# Patient Record
Sex: FEMALE | Race: WHITE | NOT HISPANIC OR LATINO | Employment: FULL TIME | URBAN - METROPOLITAN AREA
[De-identification: names, ages, dates, MRNs, and addresses within clinical notes are randomized per-mention and may not be internally consistent; named-entity substitution may affect disease eponyms.]

---

## 2017-01-18 ENCOUNTER — HOSPITAL ENCOUNTER (OUTPATIENT)
Dept: RADIOLOGY | Facility: MEDICAL CENTER | Age: 67
End: 2017-01-18
Attending: STUDENT IN AN ORGANIZED HEALTH CARE EDUCATION/TRAINING PROGRAM
Payer: COMMERCIAL

## 2017-01-18 DIAGNOSIS — Z13.9 SCREENING: ICD-10-CM

## 2017-01-18 PROCEDURE — 77063 BREAST TOMOSYNTHESIS BI: CPT

## 2017-02-09 DIAGNOSIS — Z01.810 PRE-OPERATIVE CARDIOVASCULAR EXAMINATION: ICD-10-CM

## 2017-02-09 LAB — EKG IMPRESSION: NORMAL

## 2017-09-25 ENCOUNTER — APPOINTMENT (OUTPATIENT)
Dept: RADIOLOGY | Facility: MEDICAL CENTER | Age: 67
End: 2017-09-25
Attending: EMERGENCY MEDICINE
Payer: COMMERCIAL

## 2017-09-25 ENCOUNTER — HOSPITAL ENCOUNTER (EMERGENCY)
Facility: MEDICAL CENTER | Age: 67
End: 2017-09-25
Attending: EMERGENCY MEDICINE
Payer: COMMERCIAL

## 2017-09-25 VITALS
TEMPERATURE: 98.2 F | OXYGEN SATURATION: 94 % | HEART RATE: 68 BPM | RESPIRATION RATE: 17 BRPM | DIASTOLIC BLOOD PRESSURE: 88 MMHG | HEIGHT: 65 IN | SYSTOLIC BLOOD PRESSURE: 169 MMHG | WEIGHT: 154 LBS | BODY MASS INDEX: 25.66 KG/M2

## 2017-09-25 DIAGNOSIS — S20.219A CHEST WALL CONTUSION, UNSPECIFIED LATERALITY, INITIAL ENCOUNTER: ICD-10-CM

## 2017-09-25 DIAGNOSIS — S09.90XA CLOSED HEAD INJURY, INITIAL ENCOUNTER: ICD-10-CM

## 2017-09-25 DIAGNOSIS — S29.012A UPPER BACK STRAIN, INITIAL ENCOUNTER: ICD-10-CM

## 2017-09-25 DIAGNOSIS — S16.1XXA NECK STRAIN, INITIAL ENCOUNTER: ICD-10-CM

## 2017-09-25 DIAGNOSIS — S80.11XA MULTIPLE LEG CONTUSIONS, RIGHT, INITIAL ENCOUNTER: ICD-10-CM

## 2017-09-25 LAB
ALBUMIN SERPL BCP-MCNC: 4.3 G/DL (ref 3.2–4.9)
ALBUMIN/GLOB SERPL: 1.4 G/DL
ALP SERPL-CCNC: 62 U/L (ref 30–99)
ALT SERPL-CCNC: 19 U/L (ref 2–50)
ANION GAP SERPL CALC-SCNC: 8 MMOL/L (ref 0–11.9)
AST SERPL-CCNC: 27 U/L (ref 12–45)
BASOPHILS # BLD AUTO: 0.4 % (ref 0–1.8)
BASOPHILS # BLD: 0.02 K/UL (ref 0–0.12)
BILIRUB SERPL-MCNC: 0.6 MG/DL (ref 0.1–1.5)
BUN SERPL-MCNC: 9 MG/DL (ref 8–22)
CALCIUM SERPL-MCNC: 9.4 MG/DL (ref 8.5–10.5)
CHLORIDE SERPL-SCNC: 105 MMOL/L (ref 96–112)
CO2 SERPL-SCNC: 23 MMOL/L (ref 20–33)
CREAT SERPL-MCNC: 0.71 MG/DL (ref 0.5–1.4)
EOSINOPHIL # BLD AUTO: 0.02 K/UL (ref 0–0.51)
EOSINOPHIL NFR BLD: 0.4 % (ref 0–6.9)
ERYTHROCYTE [DISTWIDTH] IN BLOOD BY AUTOMATED COUNT: 43.2 FL (ref 35.9–50)
GFR SERPL CREATININE-BSD FRML MDRD: >60 ML/MIN/1.73 M 2
GLOBULIN SER CALC-MCNC: 3.1 G/DL (ref 1.9–3.5)
GLUCOSE SERPL-MCNC: 98 MG/DL (ref 65–99)
HCT VFR BLD AUTO: 46.1 % (ref 37–47)
HGB BLD-MCNC: 15.4 G/DL (ref 12–16)
IMM GRANULOCYTES # BLD AUTO: 0.04 K/UL (ref 0–0.11)
IMM GRANULOCYTES NFR BLD AUTO: 0.8 % (ref 0–0.9)
LYMPHOCYTES # BLD AUTO: 1.3 K/UL (ref 1–4.8)
LYMPHOCYTES NFR BLD: 27.1 % (ref 22–41)
MCH RBC QN AUTO: 30.7 PG (ref 27–33)
MCHC RBC AUTO-ENTMCNC: 33.4 G/DL (ref 33.6–35)
MCV RBC AUTO: 92 FL (ref 81.4–97.8)
MONOCYTES # BLD AUTO: 0.29 K/UL (ref 0–0.85)
MONOCYTES NFR BLD AUTO: 6.1 % (ref 0–13.4)
NEUTROPHILS # BLD AUTO: 3.12 K/UL (ref 2–7.15)
NEUTROPHILS NFR BLD: 65.2 % (ref 44–72)
NRBC # BLD AUTO: 0 K/UL
NRBC BLD AUTO-RTO: 0 /100 WBC
PLATELET # BLD AUTO: 247 K/UL (ref 164–446)
PMV BLD AUTO: 10.8 FL (ref 9–12.9)
POTASSIUM SERPL-SCNC: 4 MMOL/L (ref 3.6–5.5)
PROT SERPL-MCNC: 7.4 G/DL (ref 6–8.2)
RBC # BLD AUTO: 5.01 M/UL (ref 4.2–5.4)
SODIUM SERPL-SCNC: 136 MMOL/L (ref 135–145)
WBC # BLD AUTO: 4.8 K/UL (ref 4.8–10.8)

## 2017-09-25 PROCEDURE — 36415 COLL VENOUS BLD VENIPUNCTURE: CPT

## 2017-09-25 PROCEDURE — 700105 HCHG RX REV CODE 258: Performed by: EMERGENCY MEDICINE

## 2017-09-25 PROCEDURE — 72131 CT LUMBAR SPINE W/O DYE: CPT

## 2017-09-25 PROCEDURE — 99285 EMERGENCY DEPT VISIT HI MDM: CPT

## 2017-09-25 PROCEDURE — 85025 COMPLETE CBC W/AUTO DIFF WBC: CPT

## 2017-09-25 PROCEDURE — 72125 CT NECK SPINE W/O DYE: CPT

## 2017-09-25 PROCEDURE — 700102 HCHG RX REV CODE 250 W/ 637 OVERRIDE(OP): Performed by: EMERGENCY MEDICINE

## 2017-09-25 PROCEDURE — 70450 CT HEAD/BRAIN W/O DYE: CPT

## 2017-09-25 PROCEDURE — 80053 COMPREHEN METABOLIC PANEL: CPT

## 2017-09-25 PROCEDURE — A9270 NON-COVERED ITEM OR SERVICE: HCPCS | Performed by: EMERGENCY MEDICINE

## 2017-09-25 PROCEDURE — 71260 CT THORAX DX C+: CPT

## 2017-09-25 PROCEDURE — 72128 CT CHEST SPINE W/O DYE: CPT

## 2017-09-25 PROCEDURE — 700117 HCHG RX CONTRAST REV CODE 255: Performed by: EMERGENCY MEDICINE

## 2017-09-25 RX ORDER — IBUPROFEN 600 MG/1
600 TABLET ORAL ONCE
Status: COMPLETED | OUTPATIENT
Start: 2017-09-25 | End: 2017-09-25

## 2017-09-25 RX ORDER — AMLODIPINE BESYLATE 5 MG/1
5 TABLET ORAL DAILY
Qty: 30 TAB | Refills: 0 | Status: SHIPPED | OUTPATIENT
Start: 2017-09-25 | End: 2018-12-11

## 2017-09-25 RX ORDER — SODIUM CHLORIDE 9 MG/ML
INJECTION, SOLUTION INTRAVENOUS CONTINUOUS
Status: DISCONTINUED | OUTPATIENT
Start: 2017-09-25 | End: 2017-09-25 | Stop reason: HOSPADM

## 2017-09-25 RX ADMIN — SODIUM CHLORIDE: 9 INJECTION, SOLUTION INTRAVENOUS at 08:18

## 2017-09-25 RX ADMIN — IBUPROFEN 600 MG: 600 TABLET, FILM COATED ORAL at 10:48

## 2017-09-25 RX ADMIN — IOHEXOL 100 ML: 350 INJECTION, SOLUTION INTRAVENOUS at 10:23

## 2017-09-25 ASSESSMENT — PAIN SCALES - GENERAL
PAINLEVEL_OUTOF10: 4
PAINLEVEL_OUTOF10: 3
PAINLEVEL_OUTOF10: 4
PAINLEVEL_OUTOF10: 4
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
## 2017-09-25 NOTE — ED NOTES
Pt has returned from CT.  All results posted.  Pt requested ibuprofen, ERP phoned and aware, TO rec'd and pt medicated per MAR.  Pt updated that ERP to come bedside for re-eval asap.

## 2017-09-25 NOTE — DISCHARGE INSTRUCTIONS
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  MAKE SURE YOU:   · Understand these instructions.  · Will watch your condition.  · Will get help right away if you are not doing well or get worse.     This information is not intended to replace advice given to you by your health care provider. Make sure you discuss any questions you have with your health care provider.     Document Released: 11/30/2009 Document Revised: 01/08/2016 Document Reviewed: 08/25/2014  [employer]IC[employer] Interactive Patient Education ©2016 [employer]  Return if fever, vomiting or if no better in 12 hours.

## 2017-09-25 NOTE — ED NOTES
Pt found a jacket in her belongings which she wore for discharge home.  Pt has a friend picking her up to drive her home.  Pt ambulatory from the ED w/ steady gait.  All belongings in possession on discharge.  Pt escorted to the lobby by RN.

## 2017-12-14 ENCOUNTER — OFFICE VISIT (OUTPATIENT)
Dept: CARDIOLOGY | Facility: MEDICAL CENTER | Age: 67
End: 2017-12-14
Payer: COMMERCIAL

## 2017-12-14 VITALS
WEIGHT: 151 LBS | OXYGEN SATURATION: 98 % | DIASTOLIC BLOOD PRESSURE: 78 MMHG | HEART RATE: 66 BPM | HEIGHT: 65 IN | BODY MASS INDEX: 25.16 KG/M2 | SYSTOLIC BLOOD PRESSURE: 140 MMHG

## 2017-12-14 DIAGNOSIS — I10 ESSENTIAL HYPERTENSION: ICD-10-CM

## 2017-12-14 LAB — EKG IMPRESSION: NORMAL

## 2017-12-14 PROCEDURE — 99244 OFF/OP CNSLTJ NEW/EST MOD 40: CPT | Performed by: INTERNAL MEDICINE

## 2017-12-14 PROCEDURE — 93000 ELECTROCARDIOGRAM COMPLETE: CPT | Performed by: INTERNAL MEDICINE

## 2017-12-14 RX ORDER — UBIDECARENONE 50 MG
2 CAPSULE ORAL DAILY
COMMUNITY
End: 2018-12-11

## 2017-12-14 ASSESSMENT — ENCOUNTER SYMPTOMS
MUSCULOSKELETAL NEGATIVE: 1
DIZZINESS: 0
MEMORY LOSS: 0
HEARTBURN: 0
DOUBLE VISION: 0
LOSS OF CONSCIOUSNESS: 0
COUGH: 0
EYES NEGATIVE: 1
PALPITATIONS: 0
INSOMNIA: 1
SHORTNESS OF BREATH: 0
DIAPHORESIS: 0
NERVOUS/ANXIOUS: 0
DEPRESSION: 0
BLURRED VISION: 0
BRUISES/BLEEDS EASILY: 0
NAUSEA: 0

## 2017-12-14 ASSESSMENT — LIFESTYLE VARIABLES: SUBSTANCE_ABUSE: 0
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
## 2017-12-19 ENCOUNTER — HOSPITAL ENCOUNTER (OUTPATIENT)
Dept: CARDIOLOGY | Facility: MEDICAL CENTER | Age: 67
End: 2017-12-19
Attending: INTERNAL MEDICINE
Payer: COMMERCIAL

## 2017-12-19 DIAGNOSIS — I10 ESSENTIAL HYPERTENSION: ICD-10-CM

## 2017-12-19 PROCEDURE — 93306 TTE W/DOPPLER COMPLETE: CPT

## 2017-12-20 ENCOUNTER — TELEPHONE (OUTPATIENT)
Dept: CARDIOLOGY | Facility: MEDICAL CENTER | Age: 67
End: 2017-12-20

## 2017-12-20 DIAGNOSIS — E28.39 ESTROGEN DEFICIENCY: ICD-10-CM

## 2017-12-20 LAB
LV EJECT FRACT MOD 2C 99903: 49.74
LV EJECT FRACT MOD 4C 99902: 61.26
LV EJECT FRACT MOD BP 99901: 58.61

## 2017-12-20 NOTE — TELEPHONE ENCOUNTER
S/w pt, pt reports she does not have some one that could help her check her BP at home, she usually have some one from work checked it and she has not returned to work since the last time she saw Dr Bennett, pt reports she'll be back tomorrow and will be on for 3 days and she will have some one checked her BP, instructed pt to call the readings back, pt agreed and verbalizes understanding

## 2017-12-20 NOTE — TELEPHONE ENCOUNTER
----- Message -----  From: Verenice Bennett M.D.  Sent: 12/20/2017   1:27 PM  To: Marina Concepcion R.N.    Strong & normal heart on echo  No concerns - f/u as planned    ----- Message -----   From: Verenice Bennett M.D.   Sent: 12/29/2017   1:00 PM   To: Marina Concepcion R.N.     Labs great - including thyroid   Wondering how her BP is...   Tx!     =============================================================    Attempted to call pt, no answer, left vm to call back

## 2017-12-20 NOTE — TELEPHONE ENCOUNTER
----- Message -----  From: Verenice Bennett M.D.  Sent: 12/20/2017  To: Marina Concepcion R.N.  Subject: future task                                      Please see my note from December 14      =========================================    Upon chart review Dr Bennett OV notes 12/14/17:    We will call her in one week, if her blood pressure is still high we will increase the losartan or out low-dose carvedilol twice a day 12.5 mg.     Attempted to call pt, no answer, left vm to call back

## 2017-12-29 LAB
ALBUMIN SERPL-MCNC: 4.2 G/DL (ref 3.6–4.8)
ALBUMIN/GLOB SERPL: 1.6 {RATIO} (ref 1.2–2.2)
ALP SERPL-CCNC: 72 IU/L (ref 39–117)
ALT SERPL-CCNC: 17 IU/L (ref 0–32)
AST SERPL-CCNC: 20 IU/L (ref 0–40)
BILIRUB SERPL-MCNC: 0.5 MG/DL (ref 0–1.2)
BUN SERPL-MCNC: 11 MG/DL (ref 8–27)
BUN/CREAT SERPL: 15 (ref 12–28)
CALCIUM SERPL-MCNC: 9.6 MG/DL (ref 8.7–10.3)
CHLORIDE SERPL-SCNC: 104 MMOL/L (ref 96–106)
CHOLEST SERPL-MCNC: 239 MG/DL (ref 100–199)
CO2 SERPL-SCNC: 26 MMOL/L (ref 18–29)
COMMENT 011824: ABNORMAL
CREAT SERPL-MCNC: 0.75 MG/DL (ref 0.57–1)
FT4I SERPL CALC-MCNC: 1.7 (ref 1.2–4.9)
GLOBULIN SER CALC-MCNC: 2.7 G/DL (ref 1.5–4.5)
GLUCOSE SERPL-MCNC: 79 MG/DL (ref 65–99)
HDLC SERPL-MCNC: 56 MG/DL
IF AFRICAN AMERICAN  100797: 95 ML/MIN/1.73
IF NON AFRICAN AMER 100791: 83 ML/MIN/1.73
LDLC SERPL CALC-MCNC: 162 MG/DL (ref 0–99)
POTASSIUM SERPL-SCNC: 4.3 MMOL/L (ref 3.5–5.2)
PROT SERPL-MCNC: 6.9 G/DL (ref 6–8.5)
SODIUM SERPL-SCNC: 145 MMOL/L (ref 134–144)
T3RU NFR SERPL: 26 % (ref 24–39)
T4 SERPL-MCNC: 6.4 UG/DL (ref 4.5–12)
TRIGL SERPL-MCNC: 104 MG/DL (ref 0–149)
TSH SERPL DL<=0.005 MIU/L-ACNC: 0.74 UIU/ML (ref 0.45–4.5)
VLDLC SERPL CALC-MCNC: 21 MG/DL (ref 5–40)

## 2018-01-04 RX ORDER — ESTRADIOL 1 MG/1
1 TABLET ORAL DAILY
Qty: 30 TAB | Refills: 0 | OUTPATIENT
Start: 2018-01-04 | End: 2019-01-31

## 2018-01-04 NOTE — TELEPHONE ENCOUNTER
Called pt to checked and see if she has BP readings yet, per pt she has not been taking her BP lately, reinforced the importance of keeping BP log, pt verbalizes understanding. Discussed with pt also her recent Echo and lab results per Dr Bennett, pt understands.    Pt would like to ask Dr Bennett if she could fill her Estradiol, pt reports she stopped taking it about a year ago because she got afraid of having breast cancer from it but pt reports she can't stand the night sweats, mood swings that she currently are experiencing, pt reports she currently don't gave PCP and won't see a new one until next month.     To Dr Bennett

## 2018-01-04 NOTE — TELEPHONE ENCOUNTER
RUSLAN Coleman RTaniaNTania   Caller: Unspecified (2 weeks ago)             One month only   NR      =====================================================    Rx for Estradiol sent to [facility]     Attempted to call pt, no answer, detailed vm left

## 2018-01-11 ENCOUNTER — TELEPHONE (OUTPATIENT)
Dept: CARDIOLOGY | Facility: MEDICAL CENTER | Age: 68
End: 2018-01-11

## 2018-01-11 DIAGNOSIS — I10 ESSENTIAL HYPERTENSION: ICD-10-CM

## 2018-01-12 RX ORDER — LOSARTAN POTASSIUM 50 MG/1
50 TABLET ORAL DAILY
Qty: 90 TAB | Refills: 3 | OUTPATIENT
Start: 2018-01-12 | End: 2018-12-11

## 2018-01-12 NOTE — TELEPHONE ENCOUNTER
RUSLAN Coleman RTaniaNTania   Caller: Unspecified (Yesterday,  4:57 PM)             Ok to fill   tx      =====================================================    Refill Rx for Losartan 50mg daily called in to [facility]

## 2018-01-12 NOTE — TELEPHONE ENCOUNTER
----- Message from Bernadette Martini, Med Ass't sent at 1/11/2018  3:53 PM PST -----  Regarding: new RX request  New RX request for: losartan 50mg  Pharmacy is [facility] on [facility]  Medication is not located on med list, however mentioned on office visit notes, okay to fill?    ===============================================================    Attempted to call pt as she has not reported her BP log back to us, no answer, left vm to call back    To Dr Bennett if ok to fill Losartan 50mg? Thank You.

## 2018-06-05 ENCOUNTER — HOSPITAL ENCOUNTER (OUTPATIENT)
Dept: RADIOLOGY | Facility: MEDICAL CENTER | Age: 68
End: 2018-06-05
Attending: OBSTETRICS & GYNECOLOGY
Payer: COMMERCIAL

## 2018-06-05 DIAGNOSIS — Z12.31 ENCOUNTER FOR SCREENING MAMMOGRAM FOR MALIGNANT NEOPLASM OF BREAST: ICD-10-CM

## 2018-06-05 PROCEDURE — 77067 SCR MAMMO BI INCL CAD: CPT

## 2018-12-11 ENCOUNTER — HOSPITAL ENCOUNTER (OUTPATIENT)
Facility: MEDICAL CENTER | Age: 68
End: 2018-12-11
Attending: NURSE PRACTITIONER
Payer: COMMERCIAL

## 2018-12-11 ENCOUNTER — OFFICE VISIT (OUTPATIENT)
Dept: MEDICAL GROUP | Facility: MEDICAL CENTER | Age: 68
End: 2018-12-11
Payer: COMMERCIAL

## 2018-12-11 VITALS
WEIGHT: 150 LBS | DIASTOLIC BLOOD PRESSURE: 90 MMHG | TEMPERATURE: 97.4 F | RESPIRATION RATE: 16 BRPM | OXYGEN SATURATION: 96 % | HEART RATE: 76 BPM | HEIGHT: 65 IN | BODY MASS INDEX: 24.99 KG/M2 | SYSTOLIC BLOOD PRESSURE: 112 MMHG

## 2018-12-11 DIAGNOSIS — R10.2 SUPRAPUBIC PRESSURE: ICD-10-CM

## 2018-12-11 DIAGNOSIS — R35.0 URINARY FREQUENCY: ICD-10-CM

## 2018-12-11 LAB
APPEARANCE UR: CLEAR
BILIRUB UR STRIP-MCNC: NEGATIVE MG/DL
COLOR UR AUTO: YELLOW
GLUCOSE UR STRIP.AUTO-MCNC: NEGATIVE MG/DL
KETONES UR STRIP.AUTO-MCNC: NORMAL MG/DL
LEUKOCYTE ESTERASE UR QL STRIP.AUTO: NEGATIVE
NITRITE UR QL STRIP.AUTO: NEGATIVE
PH UR STRIP.AUTO: 5.5 [PH] (ref 5–8)
PROT UR QL STRIP: NORMAL MG/DL
RBC UR QL AUTO: NEGATIVE
SP GR UR STRIP.AUTO: 1.02
UROBILINOGEN UR STRIP-MCNC: 0.2 MG/DL

## 2018-12-11 PROCEDURE — 87086 URINE CULTURE/COLONY COUNT: CPT

## 2018-12-11 PROCEDURE — 81002 URINALYSIS NONAUTO W/O SCOPE: CPT | Performed by: NURSE PRACTITIONER

## 2018-12-11 PROCEDURE — 99203 OFFICE O/P NEW LOW 30 MIN: CPT | Performed by: NURSE PRACTITIONER

## 2018-12-11 RX ORDER — NITROFURANTOIN 25; 75 MG/1; MG/1
100 CAPSULE ORAL 2 TIMES DAILY
Qty: 10 CAP | Refills: 0 | Status: SHIPPED | OUTPATIENT
Start: 2018-12-11 | End: 2019-01-31

## 2018-12-11 ASSESSMENT — PATIENT HEALTH QUESTIONNAIRE - PHQ9: CLINICAL INTERPRETATION OF PHQ2 SCORE: 0

## 2018-12-11 NOTE — PROGRESS NOTES
"Subjective:     Chief Complaint   Patient presents with   • UTI     Jacqueline Woodard is a 68 y.o. female new patient here for evaluation of dysuria and suprapubic pressure.  She has not been seen by the medical group since 2014.  3 days ago she developed suprapubic pressure, generalized abdominal discomfort, dysuria, urinary frequency.  She started herself on Keflex, last dose was this morning.  She does feel that this helped slightly but her symptoms are not resolved.  She continues to feel the need to void often, is passing small amounts of urine, she has some lower abdominal pressure.  She does have history of constipation but feels that this is generally controlled with occasional use of laxative.  Denies fever, nausea, back pain, hematuria  No problem-specific Assessment & Plan notes found for this encounter.       Current medicines (including changes today)  Current Outpatient Prescriptions   Medication Sig Dispense Refill   • nitrofurantoin monohydr macro (MACROBID) 100 MG Cap Take 1 Cap by mouth 2 times a day. 10 Cap 0   • aspirin EC (ECOTRIN) 81 MG Tablet Delayed Response Take 81 mg by mouth every day.     • Cholecalciferol (VITAMIN D-3) 5000 UNITS TABS Take 5,000 Units by mouth every day. 100 Tab 11   • therapeutic multivitamin-minerals (THERAGRAN-M) TABS Take 1 Tab by mouth every day.     • estradiol (ESTRACE) 1 MG Tab Take 1 Tab by mouth every day. 30 Tab 0   • aspirin (ASA) 81 MG CHEW Take 1 Tab by mouth every day. 100 Tab 11     No current facility-administered medications for this visit.      She  has a past medical history of Backpain; Heart burn; Hypertension; and TIA (transient ischemic attack) (2012). She also has no past medical history of Breast cancer (HCC) or Cancer (HCC).    ROS included above     Objective:     Blood pressure 112/90, pulse 76, temperature 36.3 °C (97.4 °F), temperature source Temporal, resp. rate 16, height 1.651 m (5' 5\"), weight 68 kg (150 lb), SpO2 96 %. Body mass index is " 24.96 kg/m².     Physical Exam:  General: Alert, oriented in no acute distress.  Eye contact is good, speech is normal, affect calm  Lungs: clear to auscultation bilaterally, normal effort, no wheeze/ rhonchi/ rales.  CV: regular rate and rhythm, S1, S2, no murmur  Abdomen: soft, mild suprapubic discomfort, no distention, no CVAT  Ext: no edema, color normal, vascularity normal, temperature normal    Assessment and Plan:   The following treatment plan was discussed   1. Urinary frequency   suprapubic pressure, dysuria, urinary frequency consistent with UTI.  She has, however, been on Keflex for the last few days.  Her symptoms have improved but are not resolved.  Will switch to Macrobid.  Culture sent.  Encouraged to increase fluid intake.  Follow-up if not gradually resolving  URINE CULTURE(NEW)    POCT Urinalysis    nitrofurantoin monohydr macro (MACROBID) 100 MG Cap   2. Suprapubic pressure  URINE CULTURE(NEW)    POCT Urinalysis    nitrofurantoin monohydr macro (MACROBID) 100 MG Cap       Followup: Pending culture         Please note that this dictation was created using voice recognition software. I have worked with consultants from the vendor as well as technical experts from [facility] to optimize the interface. I have made every reasonable attempt to correct obvious errors, but I expect that there are errors of grammar and possibly content that I did not discover before finalizing the note.

## 2018-12-12 DIAGNOSIS — R35.0 URINARY FREQUENCY: ICD-10-CM

## 2018-12-12 DIAGNOSIS — R10.2 SUPRAPUBIC PRESSURE: ICD-10-CM

## 2018-12-14 ENCOUNTER — TELEPHONE (OUTPATIENT)
Dept: HEALTH INFORMATION MANAGEMENT | Facility: OTHER | Age: 68
End: 2018-12-14

## 2018-12-14 LAB
BACTERIA UR CULT: NORMAL
SIGNIFICANT IND 70042: NORMAL
SITE SITE: NORMAL
SOURCE SOURCE: NORMAL

## 2019-01-21 ENCOUNTER — TELEPHONE (OUTPATIENT)
Dept: MEDICAL GROUP | Facility: MEDICAL CENTER | Age: 69
End: 2019-01-21

## 2019-01-22 NOTE — TELEPHONE ENCOUNTER
1. Caller Name: Jacqueline                                         Call Back Number: [phone] (home) [phone] (work)      Patient approves a detailed voicemail message: no    Pt called requesting a Pelvic US before her upcoming appt with Dr. Toney. She is not established yet. Please advise.

## 2019-01-31 ENCOUNTER — OFFICE VISIT (OUTPATIENT)
Dept: MEDICAL GROUP | Facility: MEDICAL CENTER | Age: 69
End: 2019-01-31
Payer: COMMERCIAL

## 2019-01-31 VITALS
DIASTOLIC BLOOD PRESSURE: 84 MMHG | TEMPERATURE: 97.8 F | HEIGHT: 65 IN | BODY MASS INDEX: 25.71 KG/M2 | WEIGHT: 154.32 LBS | HEART RATE: 68 BPM | SYSTOLIC BLOOD PRESSURE: 144 MMHG | OXYGEN SATURATION: 97 %

## 2019-01-31 DIAGNOSIS — E55.9 HYPOVITAMINOSIS D: ICD-10-CM

## 2019-01-31 DIAGNOSIS — I10 ESSENTIAL HYPERTENSION: ICD-10-CM

## 2019-01-31 DIAGNOSIS — J40 BRONCHITIS: ICD-10-CM

## 2019-01-31 DIAGNOSIS — Z76.89 ENCOUNTER TO ESTABLISH CARE: ICD-10-CM

## 2019-01-31 DIAGNOSIS — E78.00 HYPERCHOLESTEROLEMIA: ICD-10-CM

## 2019-01-31 DIAGNOSIS — Z12.11 SCREENING FOR MALIGNANT NEOPLASM OF COLON: ICD-10-CM

## 2019-01-31 DIAGNOSIS — Z78.0 MENOPAUSE: ICD-10-CM

## 2019-01-31 DIAGNOSIS — R10.31 CHRONIC RLQ PAIN: ICD-10-CM

## 2019-01-31 DIAGNOSIS — R53.83 FATIGUE, UNSPECIFIED TYPE: ICD-10-CM

## 2019-01-31 DIAGNOSIS — R19.5 DECREASED STOOL CALIBER: ICD-10-CM

## 2019-01-31 DIAGNOSIS — Z90.710 STATUS POST HYSTERECTOMY: ICD-10-CM

## 2019-01-31 DIAGNOSIS — Z00.00 HEALTH CARE MAINTENANCE: ICD-10-CM

## 2019-01-31 DIAGNOSIS — G89.29 CHRONIC RLQ PAIN: ICD-10-CM

## 2019-01-31 DIAGNOSIS — R10.2 PELVIC PAIN: ICD-10-CM

## 2019-01-31 PROBLEM — R35.0 URINARY FREQUENCY: Status: RESOLVED | Noted: 2018-12-11 | Resolved: 2019-01-31

## 2019-01-31 PROCEDURE — 99214 OFFICE O/P EST MOD 30 MIN: CPT | Performed by: INTERNAL MEDICINE

## 2019-01-31 RX ORDER — AZITHROMYCIN 250 MG/1
TABLET, FILM COATED ORAL
Qty: 6 TAB | Refills: 0 | Status: SHIPPED | OUTPATIENT
Start: 2019-01-31 | End: 2019-07-17

## 2019-01-31 RX ORDER — LOSARTAN POTASSIUM 50 MG/1
50 TABLET ORAL DAILY
Qty: 90 TAB | Refills: 0 | Status: SHIPPED | OUTPATIENT
Start: 2019-01-31 | End: 2019-06-24 | Stop reason: SDUPTHER

## 2019-01-31 ASSESSMENT — PATIENT HEALTH QUESTIONNAIRE - PHQ9: CLINICAL INTERPRETATION OF PHQ2 SCORE: 0

## 2019-01-31 NOTE — PROGRESS NOTES
CHIEF COMPLAINT  Chief Complaint   Patient presents with   • Establish Care   • Fatigue   • Vaginal Pain     HPI  Patient is a 68 y.o. female patient who presents today for the following     Hypertension  Meds: Losartan 50 mg daily,out of supply.   Denies:  -  headaches, vision problems, tinnitus.                 -  chest pain/pressure, palpitations, irregular heart beats, exertional, dyspnea, peripheral edema..  Low salt diet: Advised  Diet: Regular  Exercise: Limited  BMI: 25  FH of HTN: Mother    Hypercholesterolemia  The patient had elevated cholesterol, no medications.  Diet /Exercise/BMI:  As above  FH: Mother    IFG  The patient had low vitamin D level at 17.   No vitamin D supplement.    Pelvic/RLQ pain, decreased stool caliber, St post hysterectomy  Patient is 68-year-old, female with history of remote hysterectomy due to benign reason    Onset: 6 months ago  · Intermittent, might awake her from sleep  · Worse after defecation  · Dull, ~ 4/10  · Course: worsening  · Accompanied with decreased stool caliber, but, w/o mucus/blood  · Fatigue  · W/o hematuria/dysuria/ferquency  · Radiating towards the right ovary  Red flag: sx + 2 brothers with bladder cancer    Cough  The patient got sick 4 days ago with:  · Sore throat initially  · Fatigue, body aches  · Cough with yellow sputum, then  · Nasal congestion w yellow d/c  · Postnasal drip, pain in sinus areas - mild  · Earache, > L    Denies:  · Fever, chills,  HA  · Swollen glands, difficulty swallowing  · Wheezing, chest pain  · Decreased appetite, nausea, vomiting, diarrhea, abdominal pain  · Skin rash.    · Meds used:   OTC    Reviewed PMH, PSH, FH, SH, ALL, HCM/IMM, no changes  Reviewed MEDS, no changes    Patient Active Problem List    Diagnosis Date Noted   • Hypovitaminosis D 01/31/2019   • Health care maintenance 01/31/2019   • Suprapubic pressure 12/11/2018   • Essential hypertension 12/14/2017   • HLD (hyperlipidemia) 01/30/2014   • Vitamin D  deficiency disease 2014     CURRENT MEDICATIONS  Current Outpatient Prescriptions   Medication Sig Dispense Refill   • azithromycin (ZITHROMAX) 250 MG Tab Take 1 tabl twice daily the first day, then 1 tabl daily, PO. 6 Tab 0   • therapeutic multivitamin-minerals (THERAGRAN-M) TABS Take 1 Tab by mouth every day.     • aspirin EC (ECOTRIN) 81 MG Tablet Delayed Response Take 81 mg by mouth every day.     • Cholecalciferol (VITAMIN D-3) 5000 UNITS TABS Take 5,000 Units by mouth every day. 100 Tab 11   • aspirin (ASA) 81 MG CHEW Take 1 Tab by mouth every day. 100 Tab 11     No current facility-administered medications for this visit.      ALLERGIES  Allergies: Clindamycin; Erythromycin [akne-mycin]; and Lisinopril  PAST MEDICAL HISTORY  Past Medical History:   Diagnosis Date   • TIA (transient ischemic attack)    • Backpain     Herniated disks   • Heart burn    • Hypertension      SURGICAL HISTORY  She  has a past surgical history that includes hysterectomy, total abdominal ().  SOCIAL HISTORY  Social History   Substance Use Topics   • Smoking status: Never Smoker   • Smokeless tobacco: Never Used   • Alcohol use Yes      Comment: occassinally     Social History     Social History Narrative    ** Merged History Encounter **          FAMILY HISTORY  Family History   Problem Relation Age of Onset   • Cancer Mother         unknown   • Hypertension Mother    • Hyperlipidemia Mother    • Psychiatry Mother    • Diabetes Mother    • Cancer Father         colon   • Cancer Brother         bladder, smoker   • Cancer Brother         bladder, smoker   • Cancer Brother         lungs, smoker     Family Status   Relation Status   • Mo    • Fa (Not Specified)   • Bro (Not Specified)   • Bro (Not Specified)   • Bro (Not Specified)     ROS   Constitutional: as above.  HENT: as above.  Eyes: Negative for blurred vision.   Respiratory: as above.  Cardiovascular: Negative for chest pain, palpitations.  "  Gastrointestinal: As above  Genitourinary: Negative for dysuria.  Musculoskeletal: Negative for significant back pain.   Skin: Negative for rash and itching.   Neuro: Negative for dizziness,  headaches.   Endo/Heme/Allergies: Does not bruise/bleed easily.   Psychiatric/Behavioral: Negative for depression.    PHYSICAL EXAM   Blood pressure 144/84, pulse 68, temperature 36.6 °C (97.8 °F), temperature source Temporal, height 1.651 m (5' 5\"), weight 70 kg (154 lb 5.2 oz), SpO2 97 %. Body mass index is 25.68 kg/m².  General:  NAD, appears acutely sick  HEENT:   NC/AT, PERRLA, EOMI, TMs are clear. Pharyngeal mucosa is erythematous,  without lesions;  no cervical / supraclavicular  lymphadenopathy, no thyromegaly.    Cardiovascular: RRR.   No m/r/g. No carotid bruits .      Lungs:   Rales bilaterally, no w/r, no respiratory distress.  Abdomen: Soft, ND + BS; Some TTP/fullness in the RLQ.  Extremities:  2+ DP and radial pulses bilaterally.  No c/c/e.   Skin:  Warm, dry.  No erythema. No rash.   Neurologic: Alert & oriented x 3. No focal deficits.  Psychiatric:  Affect normal, mood normal, judgment normal.    LABS     Labs are reviewed and discussed with a patient    Lab Results   Component Value Date/Time    CHOLSTRLTOT 239 (H) 12/28/2017 10:13 AM    CHOLSTRLTOT 262 (H) 01/30/2014 04:02 PM     (H) 12/28/2017 10:13 AM     (H) 01/30/2014 04:02 PM    HDL 56 12/28/2017 10:13 AM    HDL 62 01/30/2014 04:02 PM    TRIGLYCERIDE 104 12/28/2017 10:13 AM    TRIGLYCERIDE 160 (H) 01/30/2014 04:02 PM       Lab Results   Component Value Date/Time    SODIUM 145 (H) 12/28/2017 10:13 AM    SODIUM 136 09/25/2017 08:51 AM    POTASSIUM 4.3 12/28/2017 10:13 AM    POTASSIUM 4.0 09/25/2017 08:51 AM    CHLORIDE 104 12/28/2017 10:13 AM    CHLORIDE 105 09/25/2017 08:51 AM    CO2 26 12/28/2017 10:13 AM    CO2 23 09/25/2017 08:51 AM    GLUCOSE 79 12/28/2017 10:13 AM    GLUCOSE 98 09/25/2017 08:51 AM    BUN 11 12/28/2017 10:13 AM    BUN 9 " 09/25/2017 08:51 AM    CREATININE 0.75 12/28/2017 10:13 AM    CREATININE 0.71 09/25/2017 08:51 AM    CREATININE 0.8 03/14/2008 05:00 AM    BUNCREATRAT 15 12/28/2017 10:13 AM     Lab Results   Component Value Date/Time    ALKPHOSPHAT 72 12/28/2017 10:13 AM    ALKPHOSPHAT 62 09/25/2017 08:51 AM    ASTSGOT 20 12/28/2017 10:13 AM    ASTSGOT 27 09/25/2017 08:51 AM    ALTSGPT 17 12/28/2017 10:13 AM    ALTSGPT 19 09/25/2017 08:51 AM    TBILIRUBIN 0.5 12/28/2017 10:13 AM    TBILIRUBIN 0.6 09/25/2017 08:51 AM      Lab Results   Component Value Date/Time    HBA1C 5.4 04/18/2013 07:19 AM     Lab Results   Component Value Date/Time    TSH 0.737 12/28/2017 10:13 AM     Lab Results   Component Value Date/Time    FREET4 0.79 01/30/2014 04:02 PM    FREET4 0.85 08/22/2013 10:07 AM      Ref. Range 1/30/2014 16:02 2/6/2014 08:50 12/28/2017 10:13   25-Hydroxy   Vitamin D 25 Latest Ref Range: 30 - 100 ng/mL 17 (L)     Immunoglobulin A Latest Ref Range: 68 - 378 mg/dL 222     t-TG IgA Latest Ref Range: 0 - 19 Units 4     TSH Latest Ref Range: 0.450 - 4.500 uIU/mL 1.240  0.737   Free T-4 Latest Ref Range: 0.53 - 1.43 ng/dL 0.79     Thyroxin T4 Latest Ref Range: 4.5 - 12.0 ug/dL   6.4   T-Uptake Latest Ref Range: 24 - 39 %   26   Free Thyroxin Index -Fti Latest Ref Range: 1.2 - 4.9    1.7   H Pylori Breath Test Latest Ref Range: Negative   Negative      Lab Results   Component Value Date/Time    WBC 4.8 09/25/2017 08:51 AM    RBC 5.01 09/25/2017 08:51 AM    HEMOGLOBIN 15.4 09/25/2017 08:51 AM    HEMATOCRIT 46.1 09/25/2017 08:51 AM    MCV 92.0 09/25/2017 08:51 AM    MCH 30.7 09/25/2017 08:51 AM    MCHC 33.4 (L) 09/25/2017 08:51 AM    MPV 10.8 09/25/2017 08:51 AM    NEUTSPOLYS 65.20 09/25/2017 08:51 AM    LYMPHOCYTES 27.10 09/25/2017 08:51 AM    MONOCYTES 6.10 09/25/2017 08:51 AM    EOSINOPHILS 0.40 09/25/2017 08:51 AM    BASOPHILS 0.40 09/25/2017 08:51 AM      IMAGING     None    ASSESMENT AND PLAN        1. Essential  hypertension  Restart:  - losartan (COZAAR) 50 MG Tab; Take 1 Tab by mouth every day.  Dispense: 90 Tab; Refill: 0    2. Hypercholesterolemia  Pending labs, advised low-calorie diet, daily exercise weight control  - COMP METABOLIC PANEL; Future  - Lipid Profile; Future    3. Hypovitaminosis D  Pending labs  - VITAMIN D,25 HYDROXY; Future    4. Pelvic pain  - US-PELVIC TRANSVAGINAL ONLY; Future  5. Chronic RLQ pain  - UA, lab collect, future  6. Decreased stool caliber  - REFERRAL TO OB/GYN  The patient has had worsening pelvic/radiating right lower quadrant pain associated with BM disorder and family history of bladder cancer in brothers x2.  Pending labs and imaging.  She will schedule appointment with GYN  7. Fatigue, unspecified type  Multifactorial, pending labs  - CBC WITH DIFFERENTIAL; Future  - TSH; Future  - VITAMIN D,25 HYDROXY; Future  8.  Status post hysterectomy  She was schedule appointment with GYN    9. Bronchitis  - azithromycin (ZITHROMAX) 250 MG Tab; Take 1 tabl twice daily the first day, then 1 tabl daily, PO.  Dispense: 6 Tab; Refill: 0  Advised to take abx after a meal.   Side effects of abx use discussed with a patient. Advised to stop abx and call if develop any side effect, including diarrhea.     10. Menopause  - DS-BONE DENSITY STUDY (DEXA); Future    11. Screening for malignant neoplasm of colon  - REFERRAL TO GASTROENTEROLOGY    12. Health care maintenance  13. Encounter to establish care  Reviewed PMH, PSH, FH, SH, ALL, MEDS, HCM/IMM.     High complexity    Counseling:   - Smoking:  Nonsmoker    Followup: as needed    All questions are answered.    Please note that this dictation was created using voice recognition software, and that there might be errors of angelina and possibly content.

## 2019-02-01 LAB
25(OH)D3+25(OH)D2 SERPL-MCNC: 43.7 NG/ML (ref 30–100)
ALBUMIN SERPL-MCNC: 4.5 G/DL (ref 3.6–4.8)
ALBUMIN/GLOB SERPL: 1.7 {RATIO} (ref 1.2–2.2)
ALP SERPL-CCNC: 67 IU/L (ref 39–117)
ALT SERPL-CCNC: 17 IU/L (ref 0–32)
AST SERPL-CCNC: 24 IU/L (ref 0–40)
BASOPHILS # BLD AUTO: 0 X10E3/UL (ref 0–0.2)
BASOPHILS NFR BLD AUTO: 0 %
BILIRUB SERPL-MCNC: 0.5 MG/DL (ref 0–1.2)
BUN SERPL-MCNC: 9 MG/DL (ref 8–27)
BUN/CREAT SERPL: 13 (ref 12–28)
CALCIUM SERPL-MCNC: 9.7 MG/DL (ref 8.7–10.3)
CHLORIDE SERPL-SCNC: 106 MMOL/L (ref 96–106)
CHOLEST SERPL-MCNC: 252 MG/DL (ref 100–199)
CO2 SERPL-SCNC: 24 MMOL/L (ref 20–29)
CREAT SERPL-MCNC: 0.72 MG/DL (ref 0.57–1)
EOSINOPHIL # BLD AUTO: 0.2 X10E3/UL (ref 0–0.4)
EOSINOPHIL NFR BLD AUTO: 3 %
ERYTHROCYTE [DISTWIDTH] IN BLOOD BY AUTOMATED COUNT: 13.2 % (ref 12.3–15.4)
GLOBULIN SER CALC-MCNC: 2.6 G/DL (ref 1.5–4.5)
GLUCOSE SERPL-MCNC: 87 MG/DL (ref 65–99)
HCT VFR BLD AUTO: 46 % (ref 34–46.6)
HDLC SERPL-MCNC: 59 MG/DL
HGB BLD-MCNC: 15.2 G/DL (ref 11.1–15.9)
IMM GRANULOCYTES # BLD AUTO: 0 X10E3/UL (ref 0–0.1)
IMM GRANULOCYTES NFR BLD AUTO: 0 %
IMMATURE CELLS  115398: NORMAL
LABORATORY COMMENT REPORT: ABNORMAL
LDLC SERPL CALC-MCNC: 175 MG/DL (ref 0–99)
LYMPHOCYTES # BLD AUTO: 2.2 X10E3/UL (ref 0.7–3.1)
LYMPHOCYTES NFR BLD AUTO: 44 %
MCH RBC QN AUTO: 31.3 PG (ref 26.6–33)
MCHC RBC AUTO-ENTMCNC: 33 G/DL (ref 31.5–35.7)
MCV RBC AUTO: 95 FL (ref 79–97)
MONOCYTES # BLD AUTO: 0.4 X10E3/UL (ref 0.1–0.9)
MONOCYTES NFR BLD AUTO: 8 %
MORPHOLOGY BLD-IMP: NORMAL
NEUTROPHILS # BLD AUTO: 2.2 X10E3/UL (ref 1.4–7)
NEUTROPHILS NFR BLD AUTO: 45 %
NRBC BLD AUTO-RTO: NORMAL %
PLATELET # BLD AUTO: 254 X10E3/UL (ref 150–379)
POTASSIUM SERPL-SCNC: 4.6 MMOL/L (ref 3.5–5.2)
PROT SERPL-MCNC: 7.1 G/DL (ref 6–8.5)
RBC # BLD AUTO: 4.85 X10E6/UL (ref 3.77–5.28)
SODIUM SERPL-SCNC: 143 MMOL/L (ref 134–144)
TRIGL SERPL-MCNC: 88 MG/DL (ref 0–149)
TSH SERPL DL<=0.005 MIU/L-ACNC: 1.03 UIU/ML (ref 0.45–4.5)
VLDLC SERPL CALC-MCNC: 18 MG/DL (ref 5–40)
WBC # BLD AUTO: 5 X10E3/UL (ref 3.4–10.8)

## 2019-02-15 ENCOUNTER — HOSPITAL ENCOUNTER (OUTPATIENT)
Dept: RADIOLOGY | Facility: MEDICAL CENTER | Age: 69
End: 2019-02-15
Attending: INTERNAL MEDICINE
Payer: COMMERCIAL

## 2019-02-15 DIAGNOSIS — R10.2 PELVIC PAIN: ICD-10-CM

## 2019-02-15 PROCEDURE — 76830 TRANSVAGINAL US NON-OB: CPT

## 2019-02-18 ENCOUNTER — OFFICE VISIT (OUTPATIENT)
Dept: URGENT CARE | Facility: MEDICAL CENTER | Age: 69
End: 2019-02-18
Payer: COMMERCIAL

## 2019-02-18 VITALS
TEMPERATURE: 98.4 F | HEART RATE: 65 BPM | SYSTOLIC BLOOD PRESSURE: 138 MMHG | OXYGEN SATURATION: 97 % | DIASTOLIC BLOOD PRESSURE: 88 MMHG

## 2019-02-18 DIAGNOSIS — G50.0 TRIGEMINAL NEURALGIA OF RIGHT SIDE OF FACE: Primary | ICD-10-CM

## 2019-02-18 DIAGNOSIS — R51.9 HEADACHE ON TOP OF HEAD: ICD-10-CM

## 2019-02-18 DIAGNOSIS — H92.01 ACUTE PAIN OF RIGHT EAR: ICD-10-CM

## 2019-02-18 PROCEDURE — 99214 OFFICE O/P EST MOD 30 MIN: CPT | Performed by: PHYSICIAN ASSISTANT

## 2019-02-18 RX ORDER — METHYLPREDNISOLONE 4 MG/1
TABLET ORAL
Qty: 1 KIT | Refills: 0 | Status: SHIPPED | OUTPATIENT
Start: 2019-02-18 | End: 2019-07-17

## 2019-02-18 ASSESSMENT — ENCOUNTER SYMPTOMS
COUGH: 0
NECK PAIN: 0
FOCAL WEAKNESS: 0
CHILLS: 0
HEADACHES: 1
TINGLING: 0
FEVER: 0
SENSORY CHANGE: 0

## 2019-02-18 NOTE — PATIENT INSTRUCTIONS
Trigeminal Neuralgia  Trigeminal neuralgia is a nerve disorder that causes attacks of severe facial pain. The attacks last from a few seconds to several minutes. They can happen for days, weeks, or months and then go away for months or years. Trigeminal neuralgia is also called tic douloureux.  What are the causes?  This condition is caused by damage to a nerve in the face that is called the trigeminal nerve. An attack can be triggered by:  · Talking.  · Chewing.  · Putting on makeup.  · Washing your face.  · Shaving your face.  · Brushing your teeth.  · Touching your face.  What increases the risk?  This condition is more likely to develop in:  · Women.  · People who are 50 years of age or older.  What are the signs or symptoms?  The main symptom of this condition is pain in the jaw, lips, eyes, nose, scalp, forehead, and face. The pain may be intense, stabbing, electric, or shock-like.  How is this diagnosed?  This condition is diagnosed with a physical exam. A CT scan or MRI may be done to rule out other conditions that can cause facial pain.  How is this treated?  This condition may be treated with:  · Avoiding the things that trigger your attacks.  · Pain medicine.  · Surgery. This may be done in severe cases if other medical treatment does not provide relief.  Follow these instructions at home:  · Take over-the-counter and prescription medicines only as told by your health care provider.  · If you wish to get pregnant, talk with your health care provider before you start trying to get pregnant.  · Avoid the things that trigger your attacks. It may help to:  ¨ Chew on the unaffected side of your mouth.  ¨ Avoid touching your face.  ¨ Avoid blasts of hot or cold air.  Contact a health care provider if:  · Your pain medicine is not helping.  · You develop new, unexplained symptoms, such as:  ¨ Double vision.  ¨ Facial weakness.  ¨ Changes in hearing or balance.  · You become pregnant.  Get help right away  if:  · Your pain is unbearable, and your pain medicine does not help.  This information is not intended to replace advice given to you by your health care provider. Make sure you discuss any questions you have with your health care provider.  Document Released: 12/15/2001 Document Revised: 08/20/2017 Document Reviewed: 04/11/2016  [employer] Interactive Patient Education © 2017 [employer]

## 2019-02-18 NOTE — PROGRESS NOTES
Subjective:      Jacqueline Woodard is a 68 y.o. female who presents with Otalgia (right side ear ache and whole side of face and top of head hurt, started yesterday)    PMH:  has a past medical history of Backpain; Heart burn; Hypertension; and TIA (transient ischemic attack) (2012). She also has no past medical history of Breast cancer (HCC) or Cancer (HCC).  MEDS:   Current Outpatient Prescriptions:   •  Cholecalciferol (VITAMIN D-3) 5000 UNITS TABS, Take 5,000 Units by mouth every day., Disp: 100 Tab, Rfl: 11  •  therapeutic multivitamin-minerals (THERAGRAN-M) TABS, Take 1 Tab by mouth every day., Disp: , Rfl:   •  azithromycin (ZITHROMAX) 250 MG Tab, Take 1 tabl twice daily the first day, then 1 tabl daily, PO., Disp: 6 Tab, Rfl: 0  •  losartan (COZAAR) 50 MG Tab, Take 1 Tab by mouth every day., Disp: 90 Tab, Rfl: 0  •  aspirin EC (ECOTRIN) 81 MG Tablet Delayed Response, Take 81 mg by mouth every day., Disp: , Rfl:   •  aspirin (ASA) 81 MG CHEW, Take 1 Tab by mouth every day., Disp: 100 Tab, Rfl: 11  ALLERGIES:   Allergies   Allergen Reactions   • Clindamycin Hives and Contact Dermatitis   • Erythromycin [Akne-Mycin]      Red man    • Lisinopril      SURGHX:   Past Surgical History:   Procedure Laterality Date   • HYSTERECTOMY, TOTAL ABDOMINAL  1992     SOCHX:  reports that she has never smoked. She has never used smokeless tobacco. She reports that she drinks alcohol. She reports that she does not use drugs.  FH: Reviewed with patient, not pertinent to this visit.           Patient presents with:  Otalgia: right side ear ache and whole side of face and top of head hurt, started yesterday.  PT describes lancinating pains to ear, cheek and forehead.  Pt states worse with chewing and talking.  Better when she is not moving her jaw.  PT states symptoms are random and unpredictable.  PT denies cough, congestion, tooth ache, rash or dizziness.  Pt did complete a zpack for bronchitis 2 weeks ago. These symptoms were not  present at that time.     Pt recalls having had these symptoms many years ago, and was treated with steroids.  She does not recall the diagnosis, but her current symptoms are consistent with trigeminal neuralgia.                  Otalgia    There is pain in the right ear. This is a new problem. The current episode started yesterday. The problem occurs every few minutes. The problem has been waxing and waning. There has been no fever. The pain is at a severity of 7/10. Associated symptoms include headaches. Pertinent negatives include no coughing, hearing loss or neck pain. She has tried NSAIDs for the symptoms. The treatment provided no relief.       Review of Systems   Constitutional: Negative for chills and fever.   HENT: Positive for ear pain. Negative for hearing loss.    Respiratory: Negative for cough.    Musculoskeletal: Negative for neck pain.   Neurological: Positive for headaches. Negative for tingling, sensory change and focal weakness.   All other systems reviewed and are negative.         Objective:     /88   Pulse 65   Temp 36.9 °C (98.4 °F) (Temporal)   SpO2 97%      Physical Exam   Constitutional: She is oriented to person, place, and time. She appears well-developed and well-nourished. No distress.   HENT:   Head: Normocephalic and atraumatic.   Right Ear: Tympanic membrane normal.   Left Ear: Tympanic membrane normal.   Nose: Nose normal.   Mouth/Throat: Uvula is midline, oropharynx is clear and moist and mucous membranes are normal. No tonsillar exudate.   Eyes: Pupils are equal, round, and reactive to light. Conjunctivae, EOM and lids are normal.   Neck: Normal range of motion and full passive range of motion without pain. Neck supple. No neck rigidity.   Cardiovascular: Normal rate, regular rhythm, normal heart sounds and intact distal pulses.    Pulmonary/Chest: Effort normal and breath sounds normal.   Abdominal: Soft.   Musculoskeletal: Normal range of motion.   Neurological: She is  alert and oriented to person, place, and time. She has normal strength. No cranial nerve deficit or sensory deficit. She displays a negative Romberg sign. Gait normal. GCS eye subscore is 4. GCS verbal subscore is 5. GCS motor subscore is 6.   Skin: Skin is warm and dry. Capillary refill takes less than 2 seconds. No rash noted.   Psychiatric: She has a normal mood and affect.   Nursing note and vitals reviewed.              Assessment/Plan:        1. Trigeminal neuralgia of right side of face  MethylPREDNISolone (MEDROL DOSEPAK) 4 MG Tablet Therapy Pack   2. Acute pain of right ear  MethylPREDNISolone (MEDROL DOSEPAK) 4 MG Tablet Therapy Pack   3. Headache on top of head  MethylPREDNISolone (MEDROL DOSEPAK) 4 MG Tablet Therapy Pack   CN II-XII grossly intact, normal sensation.    Pt symptoms are consistent with trigeminal neuralgia, will trial medrol dose pack as patient states she recalls this was very effective last time she had this problem.     PT should follow up with PCP in 1-2 days for re-evaluation if symptoms have not improved.  Discussed red flags and reasons to return to UC or ED.  Pt and/or family verbalized understanding of diagnosis and follow up instructions and was offered informational handout on diagnosis.  PT discharged.

## 2019-02-21 ENCOUNTER — GYNECOLOGY VISIT (OUTPATIENT)
Dept: OBGYN | Facility: MEDICAL CENTER | Age: 69
End: 2019-02-21
Payer: COMMERCIAL

## 2019-02-21 ENCOUNTER — HOSPITAL ENCOUNTER (OUTPATIENT)
Facility: MEDICAL CENTER | Age: 69
End: 2019-02-21
Attending: OBSTETRICS & GYNECOLOGY
Payer: COMMERCIAL

## 2019-02-21 VITALS
SYSTOLIC BLOOD PRESSURE: 110 MMHG | BODY MASS INDEX: 26.33 KG/M2 | HEIGHT: 65 IN | WEIGHT: 158 LBS | DIASTOLIC BLOOD PRESSURE: 80 MMHG

## 2019-02-21 DIAGNOSIS — Z01.419 WELL WOMAN EXAM: ICD-10-CM

## 2019-02-21 DIAGNOSIS — R10.2 PELVIC PAIN: ICD-10-CM

## 2019-02-21 PROCEDURE — 87624 HPV HI-RISK TYP POOLED RSLT: CPT

## 2019-02-21 PROCEDURE — 99387 INIT PM E/M NEW PAT 65+ YRS: CPT | Mod: 25 | Performed by: OBSTETRICS & GYNECOLOGY

## 2019-02-21 PROCEDURE — 88175 CYTOPATH C/V AUTO FLUID REDO: CPT

## 2019-02-21 RX ORDER — ESTRADIOL 1 MG/1
0.5 TABLET ORAL DAILY
Qty: 30 TAB | Refills: 6 | Status: SHIPPED | OUTPATIENT
Start: 2019-02-21 | End: 2020-03-20 | Stop reason: SDUPTHER

## 2019-02-21 NOTE — NON-PROVIDER
Pt here for new pt appt   Pt states that she has been having pelvic pain  Ph # verified  Pharmacy verified.

## 2019-02-21 NOTE — PROGRESS NOTES
Jacqueline Woodard is a 68 y.o.  female who presents for her Annual Gynecologic Exam      HPI Comments: Pt presents for her annual well woman exam.   Patient reports that she has been having worsening pelvic pain for the last year or so.  Worsening specifically the last 2 months.  Pain present in the vaginal area.  Reports is extremely sharp.  Area very tender to palpation.  Patient had an ultrasound ordered by her primary care provider she reports excruciating pain when the vaginal probe was placed inside.  Worsened when sitting.  She is not sexually active.  Denies any vaginal bleeding or any vaginal lesions.    When asked, patient reports that she does have significant urge to urinate which cause her significant pain and discomfort, as soon as she urinates she feels that the pain improves.  Denies any blood in the urine    History of hysterectomy approximately 30 years ago due to heavy vaginal bleeding with large clots.  Total vaginal hysterectomy performed with ovarian conservation.  Patient reports she was on Premarin following the procedure, stopped approximately 15 years ago.  Patient was then restarted on estradiol.  She stopped taking estradiol approximate 1/2 years ago.  Reports worsening hot flashes, these are increasing in frequency as well as severity.  Night sweats which cause her trouble sleeping.  No mood swings at this time.  She feels as if these symptoms are severely affecting her quality of life.    Patient also requesting a Pap smear today.    No LMP recorded (lmp unknown). Patient is postmenopausal.    Mammogram 2018.  Dexa scan never  Colonoscopy has a appointment with GI next week    Review of Systems:   Pertinent positives documented in HPI and all other systems reviewed & are negative    PGYN Hx: History of menorrhagia in the past for which she underwent a hysterectomy    All PMH, PSH, allergies, social history and FH reviewed and updated today:  Past Medical History:   Diagnosis Date    • Arthritis    • Backpain     Herniated disks   • Bleeding from the nose    • Bronchitis    • Heart burn    • Hemorrhoids    • Hypertension    • Measles    • Mumps    • Pneumonia    • TIA (transient ischemic attack) 2012   • Urinary tract infection      Past Surgical History:   Procedure Laterality Date   • HYSTERECTOMY, TOTAL ABDOMINAL  1992     Medications:   Current Outpatient Prescriptions Ordered in [facility]   Medication Sig Dispense Refill   • Cholecalciferol (VITAMIN D-3) 5000 UNITS TABS Take 5,000 Units by mouth every day. 100 Tab 11   • therapeutic multivitamin-minerals (THERAGRAN-M) TABS Take 1 Tab by mouth every day.     • MethylPREDNISolone (MEDROL DOSEPAK) 4 MG Tablet Therapy Pack Take as directed. 1 Kit 0   • azithromycin (ZITHROMAX) 250 MG Tab Take 1 tabl twice daily the first day, then 1 tabl daily, PO. 6 Tab 0   • losartan (COZAAR) 50 MG Tab Take 1 Tab by mouth every day. 90 Tab 0   • aspirin EC (ECOTRIN) 81 MG Tablet Delayed Response Take 81 mg by mouth every day.     • aspirin (ASA) 81 MG CHEW Take 1 Tab by mouth every day. 100 Tab 11     No current [facility]-ordered facility-administered medications on file.      Clindamycin; Erythromycin [akne-mycin]; and Lisinopril  Social History     Social History   • Marital status: Single     Spouse name: N/A   • Number of children: N/A   • Years of education: N/A     Social History Main Topics   • Smoking status: Never Smoker   • Smokeless tobacco: Never Used   • Alcohol use Yes      Comment: occassinally   • Drug use: No   • Sexual activity: Not Currently     Partners: Male     Other Topics Concern   • Not on file     Social History Narrative    ** Merged History Encounter **          Family History   Problem Relation Age of Onset   • Cancer Mother         unknown   • Hypertension Mother    • Hyperlipidemia Mother    • Psychiatry Mother    • Diabetes Mother    • Obesity Mother    • Cancer Father         colon   • Cancer Brother         bladder, smoker   • Cancer  "Brother         bladder, smoker   • Cancer Brother         lungs, smoker   • Seizures Sister    • Cancer Sister         ovarian          Objective:   Vital measurements:  Blood pressure 110/80, height 1.651 m (5' 5\"), weight 71.7 kg (158 lb), not currently breastfeeding.  Body mass index is 26.29 kg/m². (Goal BM I>18 <25)    Physical Exam   Nursing note and vitals reviewed.  Constitutional: She is oriented to person, place, and time. She appears well-developed and well-nourished. No distress.     HEENT:   Head: Normocephalic and atraumatic.   Right Ear: External ear normal.   Left Ear: External ear normal.   Nose: Nose normal.   Eyes: Conjunctivae and EOM are normal. Pupils are equal, round, and reactive to light. No scleral icterus.     Neck: Normal range of motion. Neck supple. No tracheal deviation present. No thyromegaly present. No cervical or supraclavicular lymphadenopathy.    Pulmonary/Chest: Effort normal and breath sounds normal. No respiratory distress. She has no wheezes. She has no rales. She exhibits no tenderness.     Cardiovascular: Regular, rate and rhythm. No edema.    Breast:  Symmetrical, normal consistency without masses., No dimpling or skin changes, Normal nipples without discharge, no axillary lymphadenopathy    Abdominal: Soft. Bowel sounds are normal. She exhibits no distension and no mass. No tenderness. She has no rebound and no guarding.     Genitourinary:  Pelvic exam was performed with patient supine.  External genitalia with no abnormal pigmentation, labial fusion, rash, tenderness, lesion or injury to the labia bilaterally.  BUS normal  Vagina is atrophic with no lesions, foul discharge, erythema, or bleeding. No foreign body around the vagina or signs of injury.   Cervix absent.  Uterus is absent.  Right adnexal area displays no mass, no tenderness and no fullness.  Left adnexal area displays no mass, no tenderness and no fullness.   There is tenderness to palpation in the anterior " aspect of the vagina especially over the bladder.    Musculoskeletal: Normal range of motion. Non tender. She exhibits no edema and no tenderness.     Lymphadenopathy: She has no cervical or supraclavicular adenopathy.     Neurological: She is alert and oriented to person, place, and time. She exhibits normal muscle tone.     Skin: Skin is warm and dry. No rash noted. She is not diaphoretic. No erythema. No pallor.     Psychiatric: She has a normal mood and affect. Her behavior is normal. Judgment and thought content normal.        Assessment:     1. Well woman exam  THINPREP PAP WITH HPV    DS-BONE DENSITY STUDY (DEXA)   2. Pelvic pain  URINALYSIS,CULTURE IF INDICATED    REFERRAL TO UROLOGY         Plan:   Pap and physical exam performed.  Discussed with patient ACOG recommendations regarding Pap smears, especially in the setting of a patient with a hysterectomy.  Patient is adamant she would like to have a Pap smear today.  I did discuss with the patient that it is not indicated at this time, but she is adamant she would like to have one done.  She understands she may be liable for charges.    DEXA scan ordered, as the patient has never had a baseline scan    Discussed pneumonia as well as herpes zoster vaccination.  Patient will decide    Up-to-date on mammogram    Patient would like to discuss hormone replacement therapy as her symptoms are severely affecting her quality of life.  Her last dose approximately 1-1/2 years ago.    HRT is effective for the treatment of menopausal hot flashes and vaginal atrophy caused by hypoestrogenism. However, it is not recommended for the prevention of chronic disease such as prevention of cardiovascular or bone disease.     In the Women's Health Initiative (WHI) combined hormone therapy (HT) trial, risks included CHD events, stroke, venous thromboembolism (VTE), and breast cancer, while benefits included a reduction of fracture and colorectal cancer risk. Results for stroke,  VTE, and fracture risk with unopposed conjugated equine estrogen were similar to those in the combined therapy trial.     In contrast, no increase in either CHD or breast cancer risk was seen with unopposed estrogen use; in fact, a possible reduction in breast cancer risk was observed. The discrepancies in CHD and breast cancer risk between the WHI unopposed estrogen trial and the combined estrogen-progestin trial suggest that the progestin played an important role in the increased CHD and breast cancer risk seen with combined therapy.    After listening to the side effects and risks/benefits of hormone replacement therapy, patient would like to be restarted on estradiol.  Will start at 0.5 mg and adjust as needed    Will reassess in 2-3 weeks.    As far as her pain is concerned, this could be associated with interstitial cystitis given the fact that there is increased discomfort with bladder filling and a relief with voiding.  The presence of additional urinary symptoms including urinary frequency and urgency may also be suggestive of interstitial cystitis.  There was also significant tenderness palpation on the anterior vaginal wall and over the bladder on abdominal examination.  will obtain urinalysis at this time as well as place a referral to urology for evaluation    Self breast awareness discussed.  Counseling: breast self exam, use and side effects of HRT and menopause  Encouraged exercise and proper diet.  Mammograms annually.    See medications and orders placed in encounter report.  Weight bearing exercise daily to strengthen bones  Calcium 1200mg daily and 800 IU daily

## 2019-02-22 DIAGNOSIS — Z01.419 WELL WOMAN EXAM: ICD-10-CM

## 2019-02-22 LAB
CYTOLOGY REG CYTOL: NORMAL
HPV HR 12 DNA CVX QL NAA+PROBE: NEGATIVE
HPV16 DNA SPEC QL NAA+PROBE: NEGATIVE
HPV18 DNA SPEC QL NAA+PROBE: NEGATIVE
SPECIMEN SOURCE: NORMAL

## 2019-06-24 DIAGNOSIS — I10 ESSENTIAL HYPERTENSION: ICD-10-CM

## 2019-06-24 RX ORDER — LOSARTAN POTASSIUM 50 MG/1
50 TABLET ORAL DAILY
Qty: 90 TAB | Refills: 0 | Status: SHIPPED | OUTPATIENT
Start: 2019-06-24 | End: 2019-07-17

## 2019-07-17 ENCOUNTER — OFFICE VISIT (OUTPATIENT)
Dept: CARDIOLOGY | Facility: MEDICAL CENTER | Age: 69
End: 2019-07-17
Payer: COMMERCIAL

## 2019-07-17 VITALS
HEART RATE: 62 BPM | BODY MASS INDEX: 26.33 KG/M2 | SYSTOLIC BLOOD PRESSURE: 142 MMHG | OXYGEN SATURATION: 96 % | DIASTOLIC BLOOD PRESSURE: 82 MMHG | WEIGHT: 158 LBS | HEIGHT: 65 IN

## 2019-07-17 DIAGNOSIS — E78.2 MIXED HYPERLIPIDEMIA: ICD-10-CM

## 2019-07-17 DIAGNOSIS — I10 ESSENTIAL HYPERTENSION: Primary | ICD-10-CM

## 2019-07-17 DIAGNOSIS — R07.89 CHEST PRESSURE: ICD-10-CM

## 2019-07-17 DIAGNOSIS — R53.83 OTHER FATIGUE: ICD-10-CM

## 2019-07-17 PROCEDURE — 99214 OFFICE O/P EST MOD 30 MIN: CPT | Performed by: NURSE PRACTITIONER

## 2019-07-17 RX ORDER — LOSARTAN POTASSIUM 100 MG/1
100 TABLET ORAL EVERY EVENING
Qty: 90 TAB | Refills: 3 | Status: SHIPPED | OUTPATIENT
Start: 2019-07-17 | End: 2020-08-19 | Stop reason: SDUPTHER

## 2019-07-17 RX ORDER — ATORVASTATIN CALCIUM 20 MG/1
20 TABLET, FILM COATED ORAL EVERY EVENING
Qty: 30 TAB | Refills: 11 | Status: SHIPPED | OUTPATIENT
Start: 2019-07-17 | End: 2020-03-20

## 2019-07-17 RX ORDER — ASCORBIC ACID 500 MG
500 TABLET ORAL DAILY
COMMUNITY
End: 2022-02-18

## 2019-07-17 ASSESSMENT — ENCOUNTER SYMPTOMS
WEAKNESS: 0
COUGH: 0
CLAUDICATION: 0
PALPITATIONS: 0
SHORTNESS OF BREATH: 0
MYALGIAS: 0
ABDOMINAL PAIN: 0
HEADACHES: 1
DIZZINESS: 1
PND: 0
ORTHOPNEA: 0

## 2019-07-17 NOTE — PROGRESS NOTES
Chief Complaint   Patient presents with   • HTN (Controlled)     follow up blood pressure        Subjective:   Jacqueline Woodard is a 69 y.o. female who presents today to follow-up on hypertension and hyperlipidemia.  He states her blood pressure has been controlled.  Is monitoring it at work.  He states her blood pressure when it is high will cause her to have chest pressure and headaches.  She feels losartan is not working and has to take additional 50 mg as needed.    She had an episode where she felt off balance when getting up out of bed to go to the bathroom at night.  This was a little on episode and she has not had any further symptoms.  She gives a history of having a TIA in the past.    She complains of lightheadedness which she finds difficult to describe.  Mostly it is feeling slightly off balance.  She also has some lateral headaches with hypertension.    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  She does not exercise regularly.  She admits to eating some processed foods.  She will take ibuprofen for her headaches.  She has a history of low vitamin D but has not been taking it regularly.      Past Medical History:   Diagnosis Date   • Arthritis    • Backpain     Herniated disks   • Bleeding from the nose    • Bronchitis    • Heart burn    • Hemorrhoids    • Hyperlipidemia    • Hypertension    • Measles    • Mumps    • Pneumonia    • TIA (transient ischemic attack) 2012   • Urinary tract infection      Past Surgical History:   Procedure Laterality Date   • HYSTERECTOMY, TOTAL ABDOMINAL  1992     Family History   Problem Relation Age of Onset   • Cancer Mother         unknown   • Hypertension Mother    • Hyperlipidemia Mother    • Psychiatry Mother    • Diabetes Mother    • Obesity Mother    • Cancer Father         colon   • Cancer Brother         bladder, smoker   • Cancer Brother         bladder, smoker   • Cancer Brother         lungs, smoker   • Seizures Sister    • Cancer Sister          ovarian     Social History     Social History   • Marital status: Single     Spouse name: N/A   • Number of children: N/A   • Years of education: N/A     Occupational History   • Not on file.     Social History Main Topics   • Smoking status: Never Smoker   • Smokeless tobacco: Never Used   • Alcohol use Yes      Comment: occassinally   • Drug use: No   • Sexual activity: Not Currently     Partners: Male     Other Topics Concern   • Not on file     Social History Narrative    ** Merged History Encounter **          Allergies   Allergen Reactions   • Clindamycin Hives and Contact Dermatitis   • Erythromycin [Akne-Mycin]      Red man    • Lisinopril      Outpatient Encounter Prescriptions as of 7/17/2019   Medication Sig Dispense Refill   • VITAMIN D, CHOLECALCIFEROL, PO Take  by mouth.     • ascorbic acid (ASCORBIC ACID) 500 MG Tab Take 500 mg by mouth every day.     • losartan (COZAAR) 100 MG Tab Take 1 Tab by mouth every evening. 90 Tab 3   • atorvastatin (LIPITOR) 20 MG Tab Take 1 Tab by mouth every evening. 30 Tab 11   • estradiol (ESTRACE) 1 MG Tab Take 0.5 Tabs by mouth every day. 30 Tab 6   • therapeutic multivitamin-minerals (THERAGRAN-M) TABS Take 1 Tab by mouth every day.     • [DISCONTINUED] losartan (COZAAR) 50 MG Tab Take 1 Tab by mouth every day. 90 Tab 0   • [DISCONTINUED] MethylPREDNISolone (MEDROL DOSEPAK) 4 MG Tablet Therapy Pack Take as directed. (Patient not taking: Reported on 7/17/2019) 1 Kit 0   • [DISCONTINUED] azithromycin (ZITHROMAX) 250 MG Tab Take 1 tabl twice daily the first day, then 1 tabl daily, PO. (Patient not taking: Reported on 7/17/2019) 6 Tab 0   • [DISCONTINUED] aspirin EC (ECOTRIN) 81 MG Tablet Delayed Response Take 81 mg by mouth every day.     • [DISCONTINUED] Cholecalciferol (VITAMIN D-3) 5000 UNITS TABS Take 5,000 Units by mouth every day. (Patient not taking: Reported on 7/17/2019) 100 Tab 11   • [DISCONTINUED] aspirin (ASA) 81 MG CHEW Take 1 Tab by mouth every day.  "(Patient not taking: Reported on 7/17/2019) 100 Tab 11     No facility-administered encounter medications on file as of 7/17/2019.      Review of Systems   Constitutional: Negative for malaise/fatigue.   Respiratory: Negative for cough and shortness of breath.    Cardiovascular: Positive for chest pain (when BP high chest pressure.). Negative for palpitations, orthopnea, claudication, leg swelling and PND.   Gastrointestinal: Negative for abdominal pain.   Musculoskeletal: Negative for myalgias.   Neurological: Positive for dizziness (lightheaded) and headaches (with elevated BP.). Negative for weakness.        Objective:   /82 (BP Location: Left arm, Patient Position: Sitting)   Pulse 62   Ht 1.651 m (5' 5\")   Wt 71.7 kg (158 lb)   LMP  (LMP Unknown)   SpO2 96%   BMI 26.29 kg/m²     Physical Exam   Constitutional: She is oriented to person, place, and time. She appears well-developed and well-nourished.   HENT:   Head: Normocephalic.   Eyes: EOM are normal.   Neck: No JVD present.   Cardiovascular: Normal rate, regular rhythm and normal heart sounds.    Pulmonary/Chest: Effort normal and breath sounds normal.   Abdominal: Soft. Bowel sounds are normal.   Musculoskeletal: She exhibits no edema.   Neurological: She is alert and oriented to person, place, and time.   Skin: Skin is warm and dry.   Psychiatric: She has a normal mood and affect.   Angry affect, poor eye contact.  Patient does not appear to except for things I am saying.     Results for AUSTEN CASTRO (MRN [id number])    Ref. Range 1/31/2019 09:06   WBC Latest Ref Range: 3.4 - 10.8 x10E3/uL 5.0   RBC Latest Ref Range: 3.77 - 5.28 x10E6/uL 4.85   Hemoglobin Latest Ref Range: 11.1 - 15.9 g/dL 15.2   Hematocrit Latest Ref Range: 34.0 - 46.6 % 46.0   MCV Latest Ref Range: 79 - 97 fL 95   MCH Latest Ref Range: 26.6 - 33.0 pg 31.3   MCHC Latest Ref Range: 31.5 - 35.7 g/dL 33.0   RDW Latest Ref Range: 12.3 - 15.4 % 13.2   Platelet Count Latest Ref Range: " 150 - 379 x10E3/uL 254   Immature Cells Unknown CANCELED   Neutrophils-Polys Latest Ref Range: Not Estab. % 45   Neutrophils (Absolute) Latest Ref Range: 1.4 - 7.0 x10E3/uL 2.2   Lymphocytes Latest Ref Range: Not Estab. % 44   Lymphs (Absolute) Latest Ref Range: 0.7 - 3.1 x10E3/uL 2.2   Monocytes Latest Ref Range: Not Estab. % 8   Monos (Absolute) Latest Ref Range: 0.1 - 0.9 x10E3/uL 0.4   Eosinophils Latest Ref Range: Not Estab. % 3   Eos (Absolute) Latest Ref Range: 0.0 - 0.4 x10E3/uL 0.2   Basophils Latest Ref Range: Not Estab. % 0   Baso (Absolute) Latest Ref Range: 0.0 - 0.2 x10E3/uL 0.0   Immature Granulocytes Latest Ref Range: Not Estab. % 0   Immature Granulocytes (abs) Latest Ref Range: 0.0 - 0.1 x10E3/uL 0.0   Nucleated RBC Unknown CANCELED   Comments-Diff Unknown CANCELED   Sodium Latest Ref Range: 134 - 144 mmol/L 143   Potassium Latest Ref Range: 3.5 - 5.2 mmol/L 4.6   Chloride Latest Ref Range: 96 - 106 mmol/L 106   Co2 Latest Ref Range: 20 - 29 mmol/L 24   Glucose Latest Ref Range: 65 - 99 mg/dL 87   Bun Latest Ref Range: 8 - 27 mg/dL 9   Creatinine Latest Ref Range: 0.57 - 1.00 mg/dL 0.72   GFR If  Latest Ref Range: >59 mL/min/1.73 100   GFR If Non  Latest Ref Range: >59 mL/min/1.73 86   Bun-Creatinine Ratio Latest Ref Range: 12 - 28  13   Calcium Latest Ref Range: 8.7 - 10.3 mg/dL 9.7   AST(SGOT) Latest Ref Range: 0 - 40 IU/L 24   ALT(SGPT) Latest Ref Range: 0 - 32 IU/L 17   Alkaline Phosphatase Latest Ref Range: 39 - 117 IU/L 67   Total Bilirubin Latest Ref Range: 0.0 - 1.2 mg/dL 0.5   Albumin Latest Ref Range: 3.6 - 4.8 g/dL 4.5   Total Protein Latest Ref Range: 6.0 - 8.5 g/dL 7.1   Globulin Latest Ref Range: 1.5 - 4.5 g/dL 2.6   A-G Ratio Latest Ref Range: 1.2 - 2.2  1.7   Cholesterol,Tot Latest Ref Range: 100 - 199 mg/dL 252 (H)   Triglycerides Latest Ref Range: 0 - 149 mg/dL 88   HDL Latest Ref Range: >39 mg/dL 59   LDL Latest Ref Range: 0 - 99 mg/dL 175 (H)    VLDL Cholesterol Calc Latest Ref Range: 5 - 40 mg/dL 18     Assessment:     1. Essential hypertension  losartan (COZAAR) 100 MG Tab   2. Mixed hyperlipidemia  LIPID PANEL    Comp Metabolic Panel    atorvastatin (LIPITOR) 20 MG Tab   3. Other fatigue  TSH   4. Chest pressure  CT-CARDIAC SCORING       Medical Decision Making:  Today's Assessment / Status / Plan:   Hypertension: Blood pressures not well controlled.  I will increase losartan to 100 mg daily and have her take it in the evening.  I have encouraged her to buy an arm cuff and check her blood pressure at home morning and evening after resting.  I have recommended that she contact our office in approximately 2 weeks with her blood pressure readings.  Her blood pressure goal is between 110 and 130 systolic.    Hyperlipidemia: She gives a history of high lipids throughout her life.  She is willing to take a statin.  I will give her atorvastatin 20 mg and check lipids in approximately 8 to 12 weeks.  Her LDL goal is 100 or less.  If she is found to have coronary disease her LDL goal would be 70 or less.    Fatigue: She complains of fatigue.  She would like to have her thyroid checked.  Her TSH done in January was normal but I will go ahead and check again.  I recommend she do cardio type exercise to help with energy.    Chest pressure: Occurs when her blood pressure is elevated.  However she has coronary disease based on her lipids over the years.  We will do a coronary calcium scan.    She will follow-up in 3 to 6 months with Dr. Haydee Bennett.  She will follow-up sooner if problems.    Collaborating Provider: Dr. Jorge Gregorio.    Please note that this dictation was created using voice recognition software. I have made every reasonable attempt to correct obvious errors, but it is possible there are errors of grammar and possibly content that I did not discover before finalizing the note.

## 2019-08-01 LAB
ALBUMIN SERPL-MCNC: 4.4 G/DL (ref 3.6–4.8)
ALBUMIN/GLOB SERPL: 1.8 {RATIO} (ref 1.2–2.2)
ALP SERPL-CCNC: 58 IU/L (ref 39–117)
ALT SERPL-CCNC: 17 IU/L (ref 0–32)
AST SERPL-CCNC: 19 IU/L (ref 0–40)
BILIRUB SERPL-MCNC: 0.5 MG/DL (ref 0–1.2)
BUN SERPL-MCNC: 13 MG/DL (ref 8–27)
BUN/CREAT SERPL: 16 (ref 12–28)
CALCIUM SERPL-MCNC: 9.6 MG/DL (ref 8.7–10.3)
CHLORIDE SERPL-SCNC: 106 MMOL/L (ref 96–106)
CHOLEST SERPL-MCNC: 169 MG/DL (ref 100–199)
CO2 SERPL-SCNC: 21 MMOL/L (ref 20–29)
CREAT SERPL-MCNC: 0.79 MG/DL (ref 0.57–1)
GLOBULIN SER CALC-MCNC: 2.5 G/DL (ref 1.5–4.5)
GLUCOSE SERPL-MCNC: 84 MG/DL (ref 65–99)
HDLC SERPL-MCNC: 54 MG/DL
LABORATORY COMMENT REPORT: NORMAL
LDLC SERPL CALC-MCNC: 95 MG/DL (ref 0–99)
POTASSIUM SERPL-SCNC: 4.7 MMOL/L (ref 3.5–5.2)
PROT SERPL-MCNC: 6.9 G/DL (ref 6–8.5)
SODIUM SERPL-SCNC: 143 MMOL/L (ref 134–144)
TRIGL SERPL-MCNC: 98 MG/DL (ref 0–149)
TSH SERPL DL<=0.005 MIU/L-ACNC: 0.97 UIU/ML (ref 0.45–4.5)
VLDLC SERPL CALC-MCNC: 20 MG/DL (ref 5–40)

## 2019-08-05 ENCOUNTER — TELEPHONE (OUTPATIENT)
Dept: CARDIOLOGY | Facility: MEDICAL CENTER | Age: 69
End: 2019-08-05

## 2019-08-05 NOTE — TELEPHONE ENCOUNTER
Associated Results   Result Notes for Comp Metabolic Panel   Notes recorded by ADAM Drake on 8/5/2019 at 12:54 PM PDT    Please contact patient and let her know that her labs are good.     Attempted to call patient, unable to reach.  Left voicemail regarding APN recommendations and to return this call if she has any questions or concerns.    [employer] message sent to patient regarding APN recommendations.

## 2019-11-11 ENCOUNTER — APPOINTMENT (OUTPATIENT)
Dept: DERMATOLOGY | Facility: IMAGING CENTER | Age: 69
End: 2019-11-11

## 2020-03-02 ENCOUNTER — HOSPITAL ENCOUNTER (OUTPATIENT)
Dept: RADIOLOGY | Facility: MEDICAL CENTER | Age: 70
End: 2020-03-02
Attending: INTERNAL MEDICINE
Payer: COMMERCIAL

## 2020-03-02 DIAGNOSIS — Z12.31 VISIT FOR SCREENING MAMMOGRAM: ICD-10-CM

## 2020-03-02 PROCEDURE — 77067 SCR MAMMO BI INCL CAD: CPT | Mod: 50

## 2020-03-16 NOTE — PROGRESS NOTES
CHIEF COMPLAINT  HTN    HPI  Jacqueline Woodard is a 69 y.o. female who presents today for the following     Hypertension  Meds: Losartan 50 mg daily,out of supply.   Denies: - headaches, vision problems, tinnitus.  - chest pain/pressure, palpitations, irregular heart beats, exertional, dyspnea, peripheral edema..  Low salt diet: Advised  Diet: Regular  Exercise: Limited   BMI: 25  FH of HTN: Mother    2/9/17  SINUS BRADYCARDIA     Echocardiography, 12/19/17  Compared to the images of the prior study done 4/13/12 - no significant   change.  Left ventricular ejection fraction is visually estimated to be 60%.  Mild concentric left ventricular hypertrophy.  Normal inferior vena cava size and inspiratory collapse.  No significant valve disease or flow abnormalities.      Hypercholesterolemia  The patient had elevated cholesterol, no medications.  Diet /Exercise/BMI: As above  FH: Mother     Hypovitaminosis D  The patient had low vitamin D level at 17.   No vitamin D supplement.     Hypovitaminosis D  The patient had low vitamin D level.  Vitamin D supplement: multivit.     Reviewed PMH, PSH, FH, SH, ALL, HCM/IMM, no changes  Reviewed MEDS, no changes    Patient Active Problem List    Diagnosis Date Noted   • Chest pressure 07/17/2019   • Pelvic pain 02/21/2019   • Hypovitaminosis D 01/31/2019   • Health care maintenance 01/31/2019   • Suprapubic pressure 12/11/2018   • Essential hypertension 12/14/2017   • Mixed hyperlipidemia 01/30/2014   • Vitamin D deficiency disease 01/30/2014     CURRENT MEDICATIONS  Current Outpatient Medications   Medication Sig Dispense Refill   • VITAMIN D, CHOLECALCIFEROL, PO Take  by mouth.     • ascorbic acid (ASCORBIC ACID) 500 MG Tab Take 500 mg by mouth every day.     • losartan (COZAAR) 100 MG Tab Take 1 Tab by mouth every evening. 90 Tab 3   • atorvastatin (LIPITOR) 20 MG Tab Take 1 Tab by mouth every evening. 30 Tab 11   • estradiol (ESTRACE) 1 MG Tab Take 0.5 Tabs by mouth every day. 30 Tab  6   • therapeutic multivitamin-minerals (THERAGRAN-M) TABS Take 1 Tab by mouth every day.       No current facility-administered medications for this visit.      ALLERGIES  Allergies: Clindamycin; Erythromycin [akne-mycin]; and Lisinopril  PAST MEDICAL HISTORY  Past Medical History:   Diagnosis Date   • TIA (transient ischemic attack)    • Arthritis    • Backpain     Herniated disks   • Bleeding from the nose    • Bronchitis    • Heart burn    • Hemorrhoids    • Hyperlipidemia    • Hypertension    • Measles    • Mumps    • Pneumonia    • Urinary tract infection      SURGICAL HISTORY  She  has a past surgical history that includes hysterectomy, total abdominal ().  SOCIAL HISTORY  Social History     Tobacco Use   • Smoking status: Never Smoker   • Smokeless tobacco: Never Used   Substance Use Topics   • Alcohol use: Yes     Comment: occassinally   • Drug use: No     Social History     Social History Narrative    ** Merged History Encounter **          FAMILY HISTORY  Family History   Problem Relation Age of Onset   • Cancer Mother         unknown   • Hypertension Mother    • Hyperlipidemia Mother    • Psychiatric Illness Mother    • Diabetes Mother    • Obesity Mother    • Cancer Father         colon   • Cancer Brother         bladder, smoker   • Cancer Brother         bladder, smoker   • Cancer Brother         lungs, smoker   • Seizures Sister    • Cancer Sister         ovarian     Family Status   Relation Name Status   • Mo     • Fa     • Bro     • Bro     • Bro     • Sis       ROS   Constitutional: Negative for fever, chills, fatigue.  HENT: Negative for congestion, sore throat.  Eyes: Negative for vision problems.   Respiratory: Negative for cough, shortness of breath.  Cardiovascular: Negative for chest pain, palpitations.   Gastrointestinal: Negative for heartburn, nausea, abdominal pain.   Genitourinary: Negative for dysuria.  Musculoskeletal: Negative  "for significant myalgia, back and joint pain.   Skin: Negative for rash.   Neuro: Negative for dizziness, weakness and headaches.   Endo/Heme/Allergies: Does not bruise/bleed easily.   Psychiatric/Behavioral: Negative for depression.    PHYSICAL EXAM   Blood Pressure 144/72 (BP Location: Left arm, Patient Position: Sitting, BP Cuff Size: Adult)   Pulse (Abnormal) 58   Temperature 37.1 °C (98.7 °F) (Temporal)   Height 1.651 m (5' 5\")   Weight 69.3 kg (152 lb 12.8 oz)   Last Menstrual Period  (LMP Unknown)   Oxygen Saturation 95%   Body Mass Index 25.43 kg/m²   General:  NAD, well appearing  HEENT:   NC/AT, PERRLA, EOMI, TMs are clear. Oropharyngeal mucosa is pink,  without lesions;  no cervical / supraclavicular  lymphadenopathy, no thyromegaly.    Cardiovascular: RRR.   No m/r/g.       Lungs:   CTAB, no w/r/r, no respiratory distress.  Abdomen: Soft, NT/ND; no hepatosplenomegaly.  Extremities:  2+ DP and radial pulses bilaterally.  No c/c/e.   Skin:  Warm, dry.  No erythema. No rash.   Neurologic: Alert & oriented x 3. CN II-XII grossly intact. No focal deficits.  Psychiatric:  Affect normal, mood normal, judgment normal.    Labs     Labs are reviewed and discussed with a patient  Lab Results   Component Value Date/Time    CHOLSTRLTOT 169 07/31/2019 04:47 AM    CHOLSTRLTOT 262 (H) 01/30/2014 04:02 PM    LDL 95 07/31/2019 04:47 AM     (H) 01/30/2014 04:02 PM    HDL 54 07/31/2019 04:47 AM    HDL 62 01/30/2014 04:02 PM    TRIGLYCERIDE 98 07/31/2019 04:47 AM    TRIGLYCERIDE 160 (H) 01/30/2014 04:02 PM       Lab Results   Component Value Date/Time    SODIUM 143 07/31/2019 04:47 AM    SODIUM 136 09/25/2017 08:51 AM    POTASSIUM 4.7 07/31/2019 04:47 AM    POTASSIUM 4.0 09/25/2017 08:51 AM    CHLORIDE 106 07/31/2019 04:47 AM    CHLORIDE 105 09/25/2017 08:51 AM    CO2 21 07/31/2019 04:47 AM    CO2 23 09/25/2017 08:51 AM    GLUCOSE 84 07/31/2019 04:47 AM    GLUCOSE 98 09/25/2017 08:51 AM    BUN 13 07/31/2019 " 04:47 AM    BUN 9 09/25/2017 08:51 AM    CREATININE 0.79 07/31/2019 04:47 AM    CREATININE 0.71 09/25/2017 08:51 AM    CREATININE 0.8 03/14/2008 05:00 AM    BUNCREATRAT 16 07/31/2019 04:47 AM     Lab Results   Component Value Date/Time    ALKPHOSPHAT 58 07/31/2019 04:47 AM    ALKPHOSPHAT 62 09/25/2017 08:51 AM    ASTSGOT 19 07/31/2019 04:47 AM    ASTSGOT 27 09/25/2017 08:51 AM    ALTSGPT 17 07/31/2019 04:47 AM    ALTSGPT 19 09/25/2017 08:51 AM    TBILIRUBIN 0.5 07/31/2019 04:47 AM    TBILIRUBIN 0.6 09/25/2017 08:51 AM      Lab Results   Component Value Date/Time    HBA1C 5.4 04/18/2013 07:19 AM     Lab Results   Component Value Date/Time    TSH 0.967 07/31/2019 04:47 AM    TSH 1.030 01/31/2019 09:06 AM     Lab Results   Component Value Date/Time    FREET4 0.79 01/30/2014 04:02 PM    FREET4 0.85 08/22/2013 10:07 AM     Lab Results   Component Value Date/Time    WBC 5.0 01/31/2019 09:06 AM    WBC 4.8 09/25/2017 08:51 AM    RBC 4.85 01/31/2019 09:06 AM    RBC 5.01 09/25/2017 08:51 AM    HEMOGLOBIN 15.2 01/31/2019 09:06 AM    HEMOGLOBIN 15.4 09/25/2017 08:51 AM    HEMATOCRIT 46.0 01/31/2019 09:06 AM    HEMATOCRIT 46.1 09/25/2017 08:51 AM    MCV 95 01/31/2019 09:06 AM    MCV 92.0 09/25/2017 08:51 AM    MCH 31.3 01/31/2019 09:06 AM    MCH 30.7 09/25/2017 08:51 AM    MCHC 33.0 01/31/2019 09:06 AM    MCHC 33.4 (L) 09/25/2017 08:51 AM    MPV 10.8 09/25/2017 08:51 AM    NEUTSPOLYS 45 01/31/2019 09:06 AM    NEUTSPOLYS 65.20 09/25/2017 08:51 AM    LYMPHOCYTES 44 01/31/2019 09:06 AM    LYMPHOCYTES 27.10 09/25/2017 08:51 AM    MONOCYTES 8 01/31/2019 09:06 AM    MONOCYTES 6.10 09/25/2017 08:51 AM    EOSINOPHILS 3 01/31/2019 09:06 AM    EOSINOPHILS 0.40 09/25/2017 08:51 AM    BASOPHILS 0 01/31/2019 09:06 AM    BASOPHILS 0.40 09/25/2017 08:51 AM      Imaging     Reviewed and discussed, per HPI    Assessment and Plan     Jacqueline Woodard is a 69 y.o. female    1. Essential hypertension  Controlled;  -The patient has been followed up by  cardiology; previous cardiologist retired, and she requested new referral   - REFERRAL TO CARDIOLOGY  - Comp Metabolic Panel; Future    2. Pure hypercholesterolemia  Pending labs, continue current treatment  - Lipid Profile; Future  - Comp Metabolic Panel; Future    3. Hypovitaminosis D  Pending labs, continue current supplement  - VITAMIN D,25 HYDROXY; Future    4. HRT  Controlled, continue current treatment    5. Menopause  - DS-BONE DENSITY STUDY (DEXA); Future    Counseling:   - Smoking:  Nonsmoker    Followup: In 6 months, and as needed    All questions are answered.    Please note that this dictation was created using voice recognition software, and that there might be errors of angelina and possibly content.

## 2020-03-20 ENCOUNTER — OFFICE VISIT (OUTPATIENT)
Dept: MEDICAL GROUP | Age: 70
End: 2020-03-20
Payer: COMMERCIAL

## 2020-03-20 VITALS
HEART RATE: 58 BPM | OXYGEN SATURATION: 95 % | TEMPERATURE: 98.7 F | SYSTOLIC BLOOD PRESSURE: 144 MMHG | DIASTOLIC BLOOD PRESSURE: 72 MMHG | WEIGHT: 152.8 LBS | BODY MASS INDEX: 25.46 KG/M2 | HEIGHT: 65 IN

## 2020-03-20 DIAGNOSIS — E55.9 HYPOVITAMINOSIS D: ICD-10-CM

## 2020-03-20 DIAGNOSIS — I10 ESSENTIAL HYPERTENSION: ICD-10-CM

## 2020-03-20 DIAGNOSIS — Z79.890 HORMONE REPLACEMENT THERAPY (HRT): ICD-10-CM

## 2020-03-20 DIAGNOSIS — Z78.0 MENOPAUSE: ICD-10-CM

## 2020-03-20 DIAGNOSIS — E78.00 PURE HYPERCHOLESTEROLEMIA: ICD-10-CM

## 2020-03-20 PROBLEM — Z92.29 HISTORY OF POSTMENOPAUSAL HRT: Status: ACTIVE | Noted: 2020-03-20

## 2020-03-20 PROCEDURE — 99214 OFFICE O/P EST MOD 30 MIN: CPT | Performed by: INTERNAL MEDICINE

## 2020-03-20 RX ORDER — ESTRADIOL 1 MG/1
0.5 TABLET ORAL DAILY
Qty: 30 TAB | Refills: 6 | Status: SHIPPED | OUTPATIENT
Start: 2020-03-20 | End: 2021-07-02

## 2020-03-20 SDOH — HEALTH STABILITY: MENTAL HEALTH: HOW OFTEN DO YOU HAVE A DRINK CONTAINING ALCOHOL?: 2-4 TIMES A MONTH

## 2020-03-20 SDOH — HEALTH STABILITY: MENTAL HEALTH: HOW MANY STANDARD DRINKS CONTAINING ALCOHOL DO YOU HAVE ON A TYPICAL DAY?: 1 OR 2

## 2020-03-20 SDOH — HEALTH STABILITY: MENTAL HEALTH: HOW OFTEN DO YOU HAVE 6 OR MORE DRINKS ON ONE OCCASION?: NEVER

## 2020-03-20 ASSESSMENT — PATIENT HEALTH QUESTIONNAIRE - PHQ9: CLINICAL INTERPRETATION OF PHQ2 SCORE: 0

## 2020-03-20 ASSESSMENT — FIBROSIS 4 INDEX: FIB4 SCORE: 1.25

## 2020-03-20 ASSESSMENT — PAIN SCALES - GENERAL: PAINLEVEL: NO PAIN

## 2020-03-25 RX ORDER — ESTRADIOL 1 MG/1
TABLET ORAL
Qty: 30 TAB | Refills: 0 | Status: SHIPPED | OUTPATIENT
Start: 2020-03-25 | End: 2021-09-22

## 2020-04-01 LAB
25(OH)D3+25(OH)D2 SERPL-MCNC: 39.4 NG/ML (ref 30–100)
ALBUMIN SERPL-MCNC: 4.3 G/DL (ref 3.8–4.8)
ALBUMIN/GLOB SERPL: 1.8 {RATIO} (ref 1.2–2.2)
ALP SERPL-CCNC: 65 IU/L (ref 39–117)
ALT SERPL-CCNC: 13 IU/L (ref 0–32)
AST SERPL-CCNC: 21 IU/L (ref 0–40)
BILIRUB SERPL-MCNC: 0.5 MG/DL (ref 0–1.2)
BUN SERPL-MCNC: 12 MG/DL (ref 8–27)
BUN/CREAT SERPL: 15 (ref 12–28)
CALCIUM SERPL-MCNC: 9.5 MG/DL (ref 8.7–10.3)
CHLORIDE SERPL-SCNC: 103 MMOL/L (ref 96–106)
CHOLEST SERPL-MCNC: 269 MG/DL (ref 100–199)
CO2 SERPL-SCNC: 25 MMOL/L (ref 20–29)
CREAT SERPL-MCNC: 0.82 MG/DL (ref 0.57–1)
GLOBULIN SER CALC-MCNC: 2.4 G/DL (ref 1.5–4.5)
GLUCOSE SERPL-MCNC: 91 MG/DL (ref 65–99)
HDLC SERPL-MCNC: 58 MG/DL
LABORATORY COMMENT REPORT: ABNORMAL
LDLC SERPL CALC-MCNC: 179 MG/DL (ref 0–99)
POTASSIUM SERPL-SCNC: 4.5 MMOL/L (ref 3.5–5.2)
PROT SERPL-MCNC: 6.7 G/DL (ref 6–8.5)
SODIUM SERPL-SCNC: 141 MMOL/L (ref 134–144)
TRIGL SERPL-MCNC: 162 MG/DL (ref 0–149)
VLDLC SERPL CALC-MCNC: 32 MG/DL (ref 5–40)

## 2020-06-29 ENCOUNTER — HOSPITAL ENCOUNTER (EMERGENCY)
Facility: MEDICAL CENTER | Age: 70
End: 2020-06-29
Attending: EMERGENCY MEDICINE
Payer: COMMERCIAL

## 2020-06-29 ENCOUNTER — APPOINTMENT (OUTPATIENT)
Dept: RADIOLOGY | Facility: MEDICAL CENTER | Age: 70
End: 2020-06-29
Attending: EMERGENCY MEDICINE
Payer: COMMERCIAL

## 2020-06-29 VITALS
OXYGEN SATURATION: 95 % | SYSTOLIC BLOOD PRESSURE: 161 MMHG | RESPIRATION RATE: 18 BRPM | HEIGHT: 55 IN | WEIGHT: 147 LBS | DIASTOLIC BLOOD PRESSURE: 83 MMHG | BODY MASS INDEX: 34.02 KG/M2 | HEART RATE: 73 BPM | TEMPERATURE: 97.4 F

## 2020-06-29 DIAGNOSIS — T07.XXXA ABRASIONS OF MULTIPLE SITES: ICD-10-CM

## 2020-06-29 DIAGNOSIS — S49.91XA UPPER EXTREMITY INJURY, RIGHT, INITIAL ENCOUNTER: ICD-10-CM

## 2020-06-29 DIAGNOSIS — S20.219A CONTUSION OF CHEST WALL, UNSPECIFIED LATERALITY, INITIAL ENCOUNTER: ICD-10-CM
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
## 2020-06-29 PROCEDURE — 71045 X-RAY EXAM CHEST 1 VIEW: CPT

## 2020-06-29 PROCEDURE — 99284 EMERGENCY DEPT VISIT MOD MDM: CPT

## 2020-06-29 PROCEDURE — 73030 X-RAY EXAM OF SHOULDER: CPT | Mod: RT

## 2020-06-29 PROCEDURE — 73060 X-RAY EXAM OF HUMERUS: CPT | Mod: RT

## 2020-06-29 PROCEDURE — A9270 NON-COVERED ITEM OR SERVICE: HCPCS | Performed by: EMERGENCY MEDICINE

## 2020-06-29 PROCEDURE — 700102 HCHG RX REV CODE 250 W/ 637 OVERRIDE(OP): Performed by: EMERGENCY MEDICINE

## 2020-06-29 PROCEDURE — 36415 COLL VENOUS BLD VENIPUNCTURE: CPT

## 2020-06-29 RX ORDER — LORAZEPAM 1 MG/1
1 TABLET ORAL ONCE
Status: COMPLETED | OUTPATIENT
Start: 2020-06-29 | End: 2020-06-29

## 2020-06-29 RX ORDER — IBUPROFEN 600 MG/1
600 TABLET ORAL ONCE
Status: COMPLETED | OUTPATIENT
Start: 2020-06-29 | End: 2020-06-29

## 2020-06-29 RX ORDER — NAPROXEN 500 MG/1
500 TABLET ORAL
Qty: 20 TAB | Refills: 0 | Status: SHIPPED | OUTPATIENT
Start: 2020-06-29 | End: 2021-09-22

## 2020-06-29 RX ORDER — CYCLOBENZAPRINE HCL 10 MG
10 TABLET ORAL 3 TIMES DAILY PRN
Qty: 30 TAB | Refills: 0 | Status: SHIPPED | OUTPATIENT
Start: 2020-06-29 | End: 2021-09-22

## 2020-06-29 RX ADMIN — LORAZEPAM 1 MG: 1 TABLET ORAL at 19:26

## 2020-06-29 RX ADMIN — IBUPROFEN 600 MG: 600 TABLET ORAL at 19:26

## 2020-06-29 ASSESSMENT — FIBROSIS 4 INDEX: FIB4 SCORE: 1.58

## 2020-06-29 NOTE — Clinical Note
Jacqueline Woodard was seen and treated in our emergency department on 6/29/2020.  She may return to work on 07/06/2020.       If you have any questions or concerns, please don't hesitate to call.      Kelvin Arriaga D.O.
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
## 2020-06-30 NOTE — ED NOTES
Pt AOx4, skin pink warm and dry, airway patent, rr even and unlabored, nad noted. No new complaints. Ambulates upon discharge without new incident or distress.
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
## 2020-08-19 ENCOUNTER — TELEPHONE (OUTPATIENT)
Dept: CARDIOLOGY | Facility: MEDICAL CENTER | Age: 70
End: 2020-08-19

## 2020-08-19 ENCOUNTER — OFFICE VISIT (OUTPATIENT)
Dept: CARDIOLOGY | Facility: MEDICAL CENTER | Age: 70
End: 2020-08-19
Payer: COMMERCIAL

## 2020-08-19 VITALS
SYSTOLIC BLOOD PRESSURE: 122 MMHG | HEIGHT: 65 IN | OXYGEN SATURATION: 96 % | DIASTOLIC BLOOD PRESSURE: 78 MMHG | HEART RATE: 66 BPM | WEIGHT: 142 LBS | BODY MASS INDEX: 23.66 KG/M2 | RESPIRATION RATE: 16 BRPM

## 2020-08-19 DIAGNOSIS — I10 ESSENTIAL HYPERTENSION: ICD-10-CM

## 2020-08-19 DIAGNOSIS — R07.9 CHEST PAIN, UNSPECIFIED TYPE: ICD-10-CM

## 2020-08-19 DIAGNOSIS — H53.8 BLURRY VISION: ICD-10-CM

## 2020-08-19 DIAGNOSIS — E78.2 MIXED HYPERLIPIDEMIA: ICD-10-CM

## 2020-08-19 LAB — EKG IMPRESSION: NORMAL

## 2020-08-19 PROCEDURE — 99215 OFFICE O/P EST HI 40 MIN: CPT | Performed by: INTERNAL MEDICINE

## 2020-08-19 PROCEDURE — 93000 ELECTROCARDIOGRAM COMPLETE: CPT | Performed by: INTERNAL MEDICINE

## 2020-08-19 RX ORDER — LOSARTAN POTASSIUM 100 MG/1
100 TABLET ORAL EVERY EVENING
Qty: 90 TAB | Refills: 3 | Status: SHIPPED | OUTPATIENT
Start: 2020-08-19 | End: 2021-09-22

## 2020-08-19 ASSESSMENT — ENCOUNTER SYMPTOMS
BLURRED VISION: 0
DYSPNEA ON EXERTION: 0
PND: 0
SHORTNESS OF BREATH: 0
NEAR-SYNCOPE: 0
NAUSEA: 0
ORTHOPNEA: 0
DIARRHEA: 0
SYNCOPE: 0
WEIGHT LOSS: 0
WEIGHT GAIN: 0
FLANK PAIN: 0
ALTERED MENTAL STATUS: 0
CLAUDICATION: 0
VOMITING: 0
CONSTIPATION: 0
IRREGULAR HEARTBEAT: 0
HEARTBURN: 0
DIZZINESS: 0
PALPITATIONS: 0
DECREASED APPETITE: 0
DEPRESSION: 0
BACK PAIN: 0
ABDOMINAL PAIN: 0
FEVER: 0
COUGH: 0

## 2020-08-19 ASSESSMENT — FIBROSIS 4 INDEX: FIB4 SCORE: 1.61
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
## 2020-08-19 NOTE — TELEPHONE ENCOUNTER
CTA paper order form completed, signed by VR, and faxed to [phone]. Receipt confirmed, form to scanning.

## 2020-08-20 ENCOUNTER — HOSPITAL ENCOUNTER (OUTPATIENT)
Dept: RADIOLOGY | Facility: MEDICAL CENTER | Age: 70
End: 2020-08-20
Attending: INTERNAL MEDICINE
Payer: COMMERCIAL

## 2020-08-20 DIAGNOSIS — H53.8 BLURRY VISION: ICD-10-CM

## 2020-08-20 PROCEDURE — 93880 EXTRACRANIAL BILAT STUDY: CPT

## 2020-08-20 PROCEDURE — 93880 EXTRACRANIAL BILAT STUDY: CPT | Mod: 26 | Performed by: INTERNAL MEDICINE

## 2020-09-10 DIAGNOSIS — Z01.812 PRE-PROCEDURE LAB EXAM: ICD-10-CM

## 2020-12-15 ENCOUNTER — APPOINTMENT (OUTPATIENT)
Dept: DERMATOLOGY | Facility: IMAGING CENTER | Age: 70
End: 2020-12-15

## 2021-01-15 DIAGNOSIS — Z23 NEED FOR VACCINATION: ICD-10-CM

## 2021-07-02 RX ORDER — ESTRADIOL 1 MG/1
TABLET ORAL
Qty: 15 TABLET | Refills: 3 | Status: SHIPPED | OUTPATIENT
Start: 2021-07-02

## 2021-09-22 ENCOUNTER — HOSPITAL ENCOUNTER (OUTPATIENT)
Facility: MEDICAL CENTER | Age: 71
End: 2021-09-22
Attending: INTERNAL MEDICINE
Payer: COMMERCIAL

## 2021-09-22 ENCOUNTER — OFFICE VISIT (OUTPATIENT)
Dept: MEDICAL GROUP | Age: 71
End: 2021-09-22
Payer: COMMERCIAL

## 2021-09-22 ENCOUNTER — HOSPITAL ENCOUNTER (OUTPATIENT)
Dept: RADIOLOGY | Facility: MEDICAL CENTER | Age: 71
End: 2021-09-22
Attending: INTERNAL MEDICINE
Payer: COMMERCIAL

## 2021-09-22 VITALS
TEMPERATURE: 99.1 F | OXYGEN SATURATION: 99 % | WEIGHT: 158 LBS | BODY MASS INDEX: 26.33 KG/M2 | SYSTOLIC BLOOD PRESSURE: 140 MMHG | HEIGHT: 65 IN | HEART RATE: 80 BPM | DIASTOLIC BLOOD PRESSURE: 90 MMHG

## 2021-09-22 DIAGNOSIS — R06.02 SOB (SHORTNESS OF BREATH): ICD-10-CM

## 2021-09-22 DIAGNOSIS — R51.9 INTRACTABLE EPISODIC HEADACHE, UNSPECIFIED HEADACHE TYPE: ICD-10-CM

## 2021-09-22 DIAGNOSIS — I10 ESSENTIAL HYPERTENSION: ICD-10-CM

## 2021-09-22 DIAGNOSIS — R12 HEARTBURN: ICD-10-CM

## 2021-09-22 DIAGNOSIS — Z12.31 ENCOUNTER FOR SCREENING MAMMOGRAM FOR MALIGNANT NEOPLASM OF BREAST: ICD-10-CM

## 2021-09-22 DIAGNOSIS — E78.5 DYSLIPIDEMIA: ICD-10-CM

## 2021-09-22 DIAGNOSIS — R53.83 FATIGUE, UNSPECIFIED TYPE: ICD-10-CM

## 2021-09-22 DIAGNOSIS — E55.9 HYPOVITAMINOSIS D: ICD-10-CM

## 2021-09-22 PROBLEM — R10.2 PELVIC PAIN: Status: RESOLVED | Noted: 2019-02-21 | Resolved: 2021-09-22

## 2021-09-22 PROBLEM — R07.9 CHEST PAIN: Status: RESOLVED | Noted: 2020-08-19 | Resolved: 2021-09-22

## 2021-09-22 PROBLEM — R07.89 CHEST PRESSURE: Status: RESOLVED | Noted: 2019-07-17 | Resolved: 2021-09-22

## 2021-09-22 PROBLEM — H53.8 BLURRY VISION: Status: RESOLVED | Noted: 2020-08-19 | Resolved: 2021-09-22

## 2021-09-22 PROBLEM — R10.2 SUPRAPUBIC PRESSURE: Status: RESOLVED | Noted: 2018-12-11 | Resolved: 2021-09-22

## 2021-09-22 PROBLEM — Z92.29 HISTORY OF POSTMENOPAUSAL HRT: Status: RESOLVED | Noted: 2020-03-20 | Resolved: 2021-09-22

## 2021-09-22 PROCEDURE — 0240U HCHG SARS-COV-2 COVID-19 NFCT DS RESP RNA 3 TRGT MIC: CPT

## 2021-09-22 PROCEDURE — 71046 X-RAY EXAM CHEST 2 VIEWS: CPT

## 2021-09-22 PROCEDURE — 99214 OFFICE O/P EST MOD 30 MIN: CPT | Performed by: INTERNAL MEDICINE

## 2021-09-22 RX ORDER — OMEPRAZOLE 20 MG/1
20 CAPSULE, DELAYED RELEASE ORAL DAILY
Qty: 90 CAPSULE | Refills: 0 | Status: SHIPPED | OUTPATIENT
Start: 2021-09-22 | End: 2022-03-02

## 2021-09-22 RX ORDER — SPIRONOLACTONE 25 MG/1
25 TABLET ORAL DAILY
Qty: 30 TABLET | Refills: 3 | Status: SHIPPED | OUTPATIENT
Start: 2021-09-22 | End: 2022-02-18

## 2021-09-22 ASSESSMENT — PATIENT HEALTH QUESTIONNAIRE - PHQ9: CLINICAL INTERPRETATION OF PHQ2 SCORE: 0

## 2021-09-22 NOTE — LETTER
September 22, 2021         Patient: Jacqueline oWodard   YOB: 1950   Date of Visit: 9/22/2021           To Whom it May Concern:    Jacqueline Woodard was seen in my clinic on 9/22/2021. She may return to work on 9/26/2021.    If you have any questions or concerns, please don't hesitate to call.        Sincerely,           Aimee Sandy M.D.  Electronically Signed

## 2021-09-22 NOTE — PROGRESS NOTES
CHIEF COMPLAINT  Hypertension, Covid symptoms come in    HPI  Jacqueline Woodard is a 71 y.o. female who presents today for the following     Hypertension, uncontrolled  Meds: Losartan 100 mg daily,out of supply.   Denies: - headaches, vision problems, tinnitus.  - chest pain/pressure, palpitations, irregular heart beats, exertional, dyspnea, peripheral edema..  Low salt diet: Advised  Diet: Regular  Exercise: Limited   BMI: 26  FH of HTN: Mother     2/9/17  SINUS BRADYCARDIA      Echocardiography, 12/19/17  Compared to the images of the prior study done 4/13/12 - no significant   change.  Left ventricular ejection fraction is visually estimated to be 60%.  Mild concentric left ventricular hypertrophy.  Normal inferior vena cava size and inspiratory collapse.  No significant valve disease or flow abnormalities.      Dyslipidemia  The patient had elevated cholesterol, no medications.  Diet /Exercise/BMI: As above  FH: Mother     Hypovitaminosis D  The patient had low vitamin D level.  Vitamin D supplement: multivit.     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    Reviewed PMH, PSH, FH, SH, ALL, HCM/IMM, no changes  Reviewed MEDS, no changes    Patient Active Problem List    Diagnosis Date Noted   • Blurry vision 08/19/2020   • Chest pain 08/19/2020   • History of postmenopausal HRT 03/20/2020   • Chest pressure 07/17/2019   • Pelvic pain 02/21/2019   • Hypovitaminosis D 01/31/2019   • Health care maintenance 01/31/2019   • Suprapubic pressure 12/11/2018   • Essential hypertension 12/14/2017   • Mixed hyperlipidemia 01/30/2014   • Vitamin D deficiency disease 01/30/2014     CURRENT MEDICATIONS  Current Outpatient Medications   Medication Sig  Dispense Refill   • spironolactone (ALDACTONE) 25 MG Tab Take 1 Tablet by mouth every day. 30 Tablet 3   • omeprazole (PRILOSEC) 20 MG delayed-release capsule Take 1 Capsule by mouth every day. 90 Capsule 0   • estradiol (ESTRACE) 1 MG Tab Take 1/2 (one-half) tablet by mouth once daily 15 tablet 3   • Cholecalciferol 1.25 MG (85287 UT) Tab VITAMIN D TABLET     • ascorbic acid (ASCORBIC ACID) 500 MG Tab Take 500 mg by mouth every day.     • therapeutic multivitamin-minerals (THERAGRAN-M) TABS Take 1 Tab by mouth every day.       No current facility-administered medications for this visit.     ALLERGIES  Allergies: Clindamycin, Erythromycin [akne-mycin], and Lisinopril  PAST MEDICAL HISTORY  Past Medical History:   Diagnosis Date   • TIA (transient ischemic attack)    • Arthritis    • Backpain     Herniated disks   • Bleeding from the nose    • Bronchitis    • Heart burn    • Hemorrhoids    • Hyperlipidemia    • Hypertension    • Measles    • Mumps    • Pneumonia    • Urinary tract infection      SURGICAL HISTORY  She  has a past surgical history that includes hysterectomy, total abdominal ().  SOCIAL HISTORY  Social History     Tobacco Use   • Smoking status: Never Smoker   • Smokeless tobacco: Never Used   Substance Use Topics   • Alcohol use: Yes     Comment: occassinally   • Drug use: No     Social History     Social History Narrative    ** Merged History Encounter **          FAMILY HISTORY  Family History   Problem Relation Age of Onset   • Cancer Mother         unknown   • Hypertension Mother    • Hyperlipidemia Mother    • Psychiatric Illness Mother    • Diabetes Mother    • Obesity Mother    • Cancer Father         colon   • Cancer Brother         bladder, smoker   • Cancer Brother         bladder, smoker   • Cancer Brother         lungs, smoker   • Seizures Sister    • Cancer Sister         ovarian     Family Status   Relation Name Status   • Mo     • Fa     • Bro     • Bro   "   • Bro     • Sis       ROS   Constitutional: Per HPI.  HENT: Per HPI.  Respiratory: Per HPI.  Cardiovascular: Negative for chest pain, palpitations.   Gastrointestinal: Per HPI.  Musculoskeletal: Per HPI.  Skin: Negative for rash.   Neuro: Per HPI.  Endo/Heme/Allergies: Does not bruise/bleed easily.     PHYSICAL EXAM   Blood Pressure 140/90   Temperature 37.3 °C (99.1 °F)   Height 1.651 m (5' 5\")   Weight 71.7 kg (158 lb)  Body mass index is 26.29 kg/m².  General:  NAD, well appearing  HEENT:   NC/AT, PERRLA, EOMI.  Cardiovascular: unlabored breathing, no peripheral cyanosis or swelling.  Lungs:   no respiratory distress.  Abdomen: non- distended.  Extremities:  No LE swelling.  Skin:  Warm, dry.  No erythema. No rash.   Neurologic: Alert & oriented x 3. CN II-XII grossly intact. No focal deficits.  Psychiatric:  Affect normal, mood normal, judgment normal.    Labs     Labs are reviewed and discussed with a patient  Lab Results   Component Value Date/Time    CHOLSTRLTOT 269 (H) 2020 05:14 AM    CHOLSTRLTOT 262 (H) 2014 04:02 PM     (H) 2020 05:14 AM     (H) 2014 04:02 PM    HDL 58 2020 05:14 AM    HDL 62 2014 04:02 PM    TRIGLYCERIDE 162 (H) 2020 05:14 AM    TRIGLYCERIDE 160 (H) 2014 04:02 PM       Lab Results   Component Value Date/Time    SODIUM 141 2020 05:14 AM    SODIUM 136 2017 08:51 AM    POTASSIUM 4.5 2020 05:14 AM    POTASSIUM 4.0 2017 08:51 AM    CHLORIDE 103 2020 05:14 AM    CHLORIDE 105 2017 08:51 AM    CO2 25 2020 05:14 AM    CO2 23 2017 08:51 AM    GLUCOSE 91 2020 05:14 AM    GLUCOSE 98 2017 08:51 AM    BUN 12 2020 05:14 AM    BUN 9 2017 08:51 AM    CREATININE 0.82 2020 05:14 AM    CREATININE 0.71 2017 08:51 AM    CREATININE 0.8 2008 05:00 AM    BUNCREATRAT 15 2020 05:14 AM     Lab Results   Component Value Date/Time "    ALKPHOSPHAT 65 03/31/2020 05:14 AM    ALKPHOSPHAT 62 09/25/2017 08:51 AM    ASTSGOT 21 03/31/2020 05:14 AM    ASTSGOT 27 09/25/2017 08:51 AM    ALTSGPT 13 03/31/2020 05:14 AM    ALTSGPT 19 09/25/2017 08:51 AM    TBILIRUBIN 0.5 03/31/2020 05:14 AM    TBILIRUBIN 0.6 09/25/2017 08:51 AM      Lab Results   Component Value Date/Time    HBA1C 5.4 04/18/2013 07:19 AM     Lab Results   Component Value Date/Time    TSH 0.967 07/31/2019 04:47 AM    TSH 1.030 01/31/2019 09:06 AM     Lab Results   Component Value Date/Time    FREET4 0.79 01/30/2014 04:02 PM    FREET4 0.85 08/22/2013 10:07 AM       Lab Results   Component Value Date/Time    WBC 5.0 01/31/2019 09:06 AM    WBC 4.8 09/25/2017 08:51 AM    RBC 4.85 01/31/2019 09:06 AM    RBC 5.01 09/25/2017 08:51 AM    HEMOGLOBIN 15.2 01/31/2019 09:06 AM    HEMOGLOBIN 15.4 09/25/2017 08:51 AM    HEMATOCRIT 46.0 01/31/2019 09:06 AM    HEMATOCRIT 46.1 09/25/2017 08:51 AM    MCV 95 01/31/2019 09:06 AM    MCV 92.0 09/25/2017 08:51 AM    MCH 31.3 01/31/2019 09:06 AM    MCH 30.7 09/25/2017 08:51 AM    MCHC 33.0 01/31/2019 09:06 AM    MCHC 33.4 (L) 09/25/2017 08:51 AM    MPV 10.8 09/25/2017 08:51 AM    NEUTSPOLYS 45 01/31/2019 09:06 AM    NEUTSPOLYS 65.20 09/25/2017 08:51 AM    LYMPHOCYTES 44 01/31/2019 09:06 AM    LYMPHOCYTES 27.10 09/25/2017 08:51 AM    MONOCYTES 8 01/31/2019 09:06 AM    MONOCYTES 6.10 09/25/2017 08:51 AM    EOSINOPHILS 3 01/31/2019 09:06 AM    EOSINOPHILS 0.40 09/25/2017 08:51 AM    BASOPHILS 0 01/31/2019 09:06 AM    BASOPHILS 0.40 09/25/2017 08:51 AM      Imaging     Pending    Assessment and Plan     Jacqueline Woodard is a 71 y.o. female    1. Essential hypertension  -Uncontrolled, add spironolactone  - spironolactone (ALDACTONE) 25 MG Tab; Take 1 Tablet by mouth every day.  Dispense: 30 Tablet; Refill: 3  - Comp Metabolic Panel; Standing    2. Dyslipidemia  Pending labs, advised low-calorie diet, daily exercise  - Comp Metabolic Panel; Standing  - Lipid Profile;  Standing    3. Intractable episodic headache, unspecified headache type  Advised self isolation while result/test pending  - CoV-2 and Flu A/B by PCR (24 hour In-House): Collect NP swab in VTM; Future  4. Fatigue, unspecified type  - CBC WITH DIFFERENTIAL; Future  - CoV-2 and Flu A/B by PCR (24 hour In-House): Collect NP swab in VTM; Future  5. SOB (shortness of breath)  - DX-CHEST-2 VIEWS; Future  - CoV-2 and Flu A/B by PCR (24 hour In-House): Collect NP swab in VTM; Future  6. Heartburn  As above    7. Encounter for screening mammogram for malignant neoplasm of breast  - MA-SCREENING MAMMO BILAT W/TOMOSYNTHESIS W/CAD; Future    Counseling:   - Smoking:  Nonsmoker    Followup: Return in about 4 weeks (around 10/20/2021) for LABS.    All questions are answered.    Please note that this dictation was created using voice recognition software, and that there might be errors of angelina and possibly content.

## 2021-09-23 LAB
FLUAV RNA SPEC QL NAA+PROBE: NEGATIVE
FLUBV RNA SPEC QL NAA+PROBE: NEGATIVE
SARS-COV-2 RNA RESP QL NAA+PROBE: NOTDETECTED
SPECIMEN SOURCE: NORMAL

## 2021-11-05 LAB
ALBUMIN SERPL-MCNC: 4.2 G/DL (ref 3.7–4.7)
ALBUMIN/GLOB SERPL: 1.8 {RATIO} (ref 1.2–2.2)
ALP SERPL-CCNC: 69 IU/L (ref 44–121)
ALT SERPL-CCNC: 17 IU/L (ref 0–32)
AST SERPL-CCNC: 22 IU/L (ref 0–40)
BASOPHILS # BLD AUTO: 0 X10E3/UL (ref 0–0.2)
BASOPHILS NFR BLD AUTO: 0 %
BILIRUB SERPL-MCNC: 0.4 MG/DL (ref 0–1.2)
BUN SERPL-MCNC: 15 MG/DL (ref 8–27)
BUN/CREAT SERPL: 20 (ref 12–28)
CALCIUM SERPL-MCNC: 9.2 MG/DL (ref 8.7–10.3)
CHLORIDE SERPL-SCNC: 106 MMOL/L (ref 96–106)
CHOLEST SERPL-MCNC: 253 MG/DL (ref 100–199)
CO2 SERPL-SCNC: 24 MMOL/L (ref 20–29)
CREAT SERPL-MCNC: 0.75 MG/DL (ref 0.57–1)
EOSINOPHIL # BLD AUTO: 0.1 X10E3/UL (ref 0–0.4)
EOSINOPHIL NFR BLD AUTO: 2 %
ERYTHROCYTE [DISTWIDTH] IN BLOOD BY AUTOMATED COUNT: 13 % (ref 11.7–15.4)
GLOBULIN SER CALC-MCNC: 2.4 G/DL (ref 1.5–4.5)
GLUCOSE SERPL-MCNC: 82 MG/DL (ref 65–99)
HCT VFR BLD AUTO: 46.5 % (ref 34–46.6)
HDLC SERPL-MCNC: 55 MG/DL
HGB BLD-MCNC: 15 G/DL (ref 11.1–15.9)
IMM GRANULOCYTES # BLD AUTO: 0 X10E3/UL (ref 0–0.1)
IMM GRANULOCYTES NFR BLD AUTO: 0 %
IMMATURE CELLS  115398: NORMAL
LABORATORY COMMENT REPORT: ABNORMAL
LDLC SERPL CALC-MCNC: 172 MG/DL (ref 0–99)
LYMPHOCYTES # BLD AUTO: 3.1 X10E3/UL (ref 0.7–3.1)
LYMPHOCYTES NFR BLD AUTO: 62 %
MCH RBC QN AUTO: 30.2 PG (ref 26.6–33)
MCHC RBC AUTO-ENTMCNC: 32.3 G/DL (ref 31.5–35.7)
MCV RBC AUTO: 94 FL (ref 79–97)
MONOCYTES # BLD AUTO: 0.4 X10E3/UL (ref 0.1–0.9)
MONOCYTES NFR BLD AUTO: 9 %
MORPHOLOGY BLD-IMP: NORMAL
NEUTROPHILS # BLD AUTO: 1.4 X10E3/UL (ref 1.4–7)
NEUTROPHILS NFR BLD AUTO: 27 %
NRBC BLD AUTO-RTO: NORMAL %
PLATELET # BLD AUTO: 250 X10E3/UL (ref 150–450)
POTASSIUM SERPL-SCNC: 4.6 MMOL/L (ref 3.5–5.2)
PROT SERPL-MCNC: 6.6 G/DL (ref 6–8.5)
RBC # BLD AUTO: 4.97 X10E6/UL (ref 3.77–5.28)
SODIUM SERPL-SCNC: 142 MMOL/L (ref 134–144)
TRIGL SERPL-MCNC: 145 MG/DL (ref 0–149)
VLDLC SERPL CALC-MCNC: 26 MG/DL (ref 5–40)
WBC # BLD AUTO: 5 X10E3/UL (ref 3.4–10.8)

## 2021-12-23 DIAGNOSIS — Z79.890 HORMONE REPLACEMENT THERAPY (HRT): ICD-10-CM

## 2021-12-23 RX ORDER — ESTRADIOL 0.03 MG/D
FILM, EXTENDED RELEASE TRANSDERMAL
Qty: 8 PATCH | Refills: 2 | Status: SHIPPED | OUTPATIENT
Start: 2021-12-23 | End: 2022-05-23

## 2022-01-12 ENCOUNTER — HOSPITAL ENCOUNTER (OUTPATIENT)
Dept: RADIOLOGY | Facility: MEDICAL CENTER | Age: 72
End: 2022-01-12
Attending: INTERNAL MEDICINE
Payer: COMMERCIAL

## 2022-01-12 DIAGNOSIS — Z12.31 ENCOUNTER FOR SCREENING MAMMOGRAM FOR MALIGNANT NEOPLASM OF BREAST: ICD-10-CM

## 2022-01-12 PROCEDURE — 77063 BREAST TOMOSYNTHESIS BI: CPT

## 2022-02-18 ENCOUNTER — OFFICE VISIT (OUTPATIENT)
Dept: URGENT CARE | Facility: CLINIC | Age: 72
End: 2022-02-18
Payer: COMMERCIAL

## 2022-02-18 ENCOUNTER — APPOINTMENT (OUTPATIENT)
Dept: RADIOLOGY | Facility: IMAGING CENTER | Age: 72
End: 2022-02-18
Attending: PHYSICIAN ASSISTANT
Payer: COMMERCIAL

## 2022-02-18 VITALS
HEIGHT: 65 IN | TEMPERATURE: 98.8 F | SYSTOLIC BLOOD PRESSURE: 102 MMHG | RESPIRATION RATE: 16 BRPM | WEIGHT: 160 LBS | DIASTOLIC BLOOD PRESSURE: 62 MMHG | OXYGEN SATURATION: 96 % | HEART RATE: 68 BPM | BODY MASS INDEX: 26.66 KG/M2

## 2022-02-18 DIAGNOSIS — R06.02 SHORTNESS OF BREATH: ICD-10-CM

## 2022-02-18 DIAGNOSIS — R10.9 RIGHT FLANK PAIN: ICD-10-CM

## 2022-02-18 DIAGNOSIS — J98.11 ATELECTASIS OF RIGHT LUNG: ICD-10-CM

## 2022-02-18 LAB
APPEARANCE UR: NORMAL
BILIRUB UR STRIP-MCNC: NEGATIVE MG/DL
COLOR UR AUTO: YELLOW
GLUCOSE UR STRIP.AUTO-MCNC: NEGATIVE MG/DL
KETONES UR STRIP.AUTO-MCNC: NEGATIVE MG/DL
LEUKOCYTE ESTERASE UR QL STRIP.AUTO: NEGATIVE
NITRITE UR QL STRIP.AUTO: NEGATIVE
PH UR STRIP.AUTO: 7.5 [PH] (ref 5–8)
PROT UR QL STRIP: NEGATIVE MG/DL
RBC UR QL AUTO: NEGATIVE
SP GR UR STRIP.AUTO: 1.01
UROBILINOGEN UR STRIP-MCNC: 0.2 MG/DL

## 2022-02-18 PROCEDURE — 99214 OFFICE O/P EST MOD 30 MIN: CPT | Performed by: PHYSICIAN ASSISTANT

## 2022-02-18 PROCEDURE — 71046 X-RAY EXAM CHEST 2 VIEWS: CPT | Mod: TC,FY | Performed by: PHYSICIAN ASSISTANT

## 2022-02-18 PROCEDURE — 81002 URINALYSIS NONAUTO W/O SCOPE: CPT | Performed by: PHYSICIAN ASSISTANT

## 2022-02-18 RX ORDER — PREDNISONE 20 MG/1
TABLET ORAL
Qty: 10 TABLET | Refills: 0 | Status: SHIPPED | OUTPATIENT
Start: 2022-02-18 | End: 2023-02-03

## 2022-02-18 RX ORDER — ALBUTEROL SULFATE 90 UG/1
2 AEROSOL, METERED RESPIRATORY (INHALATION) EVERY 6 HOURS PRN
Qty: 8.5 G | Refills: 0 | Status: SHIPPED | OUTPATIENT
Start: 2022-02-18

## 2022-02-18 ASSESSMENT — ENCOUNTER SYMPTOMS
SHORTNESS OF BREATH: 1
COUGH: 1
HEMOPTYSIS: 0
LEG PAIN: 0
FLANK PAIN: 1

## 2022-02-18 ASSESSMENT — FIBROSIS 4 INDEX: FIB4 SCORE: 1.52

## 2022-02-19 NOTE — PROGRESS NOTES
Subjective:   Jacqueline Woodard is a 71 y.o. female who presents today with   Chief Complaint   Patient presents with   • Shortness of Breath     X 4 days, RT lower back pain,      Shortness of Breath  This is a new problem. Episode onset: 4 days. The problem occurs constantly. The problem has been unchanged. Pertinent negatives include no chest pain, hemoptysis, leg pain or leg swelling. She has tried nothing for the symptoms. The treatment provided no relief.     Patient had COVID 3 weeks ago.  No history of DVT or PE.  Patient states she was coughing up some blood when she had COVID but that has resolved.  Patient has noted she has been short of breath over the last couple weeks and also has had pain with inspiration in her right lower back.  PMH:  has a past medical history of Arthritis, Backpain, Bleeding from the nose, Bronchitis, Heart burn, Hemorrhoids, Hyperlipidemia, Hypertension, Measles, Mumps, Pneumonia, TIA (transient ischemic attack) (2012), and Urinary tract infection.    She has no past medical history of Breast cancer (HCC).  MEDS:   Current Outpatient Medications:   •  predniSONE (DELTASONE) 20 MG Tab, Take one tab by mouth twice daily for 5 days., Disp: 10 Tablet, Rfl: 0  •  albuterol 108 (90 Base) MCG/ACT Aero Soln inhalation aerosol, Inhale 2 Puffs every 6 hours as needed for Shortness of Breath., Disp: 8.5 g, Rfl: 0  •  Estradiol 0.025 MG/24HR PATCH BIWEEKLY, Change 2 times a week, Disp: 8 Patch, Rfl: 2  •  omeprazole (PRILOSEC) 20 MG delayed-release capsule, Take 1 Capsule by mouth every day., Disp: 90 Capsule, Rfl: 0  •  estradiol (ESTRACE) 1 MG Tab, Take 1/2 (one-half) tablet by mouth once daily, Disp: 15 tablet, Rfl: 3  •  Cholecalciferol 1.25 MG (72329 UT) Tab, VITAMIN D TABLET, Disp: , Rfl:   •  therapeutic multivitamin-minerals (THERAGRAN-M) TABS, Take 1 Tab by mouth every day., Disp: , Rfl:   ALLERGIES:   Allergies   Allergen Reactions   • Clindamycin Hives and Contact Dermatitis   •  "Erythromycin [Akne-Mycin]      Red man    • Lisinopril      SURGHX:   Past Surgical History:   Procedure Laterality Date   • HYSTERECTOMY, TOTAL ABDOMINAL  1992     SOCHX:  reports that she has never smoked. She has never used smokeless tobacco. She reports current alcohol use. She reports that she does not use drugs.  FH: Reviewed with patient, not pertinent to this visit.     Review of Systems   Respiratory: Positive for cough (improved) and shortness of breath. Negative for hemoptysis.    Cardiovascular: Negative for chest pain and leg swelling.   Genitourinary: Positive for flank pain. Negative for dysuria, frequency, hematuria and urgency.      Objective:   /62   Pulse 68   Temp 37.1 °C (98.8 °F) (Temporal)   Resp 16   Ht 1.651 m (5' 5\")   Wt 72.6 kg (160 lb)   LMP  (LMP Unknown)   SpO2 96%   Breastfeeding No   BMI 26.63 kg/m²   Physical Exam  Vitals and nursing note reviewed.   Constitutional:       General: She is not in acute distress.     Appearance: Normal appearance. She is well-developed. She is not ill-appearing or toxic-appearing.   HENT:      Head: Normocephalic and atraumatic.      Right Ear: Hearing normal.      Left Ear: Hearing normal.   Cardiovascular:      Rate and Rhythm: Normal rate and regular rhythm.      Heart sounds: Normal heart sounds.   Pulmonary:      Effort: Pulmonary effort is normal.      Breath sounds: Normal breath sounds. No stridor. No wheezing, rhonchi or rales.   Abdominal:      Tenderness: There is no abdominal tenderness. There is no right CVA tenderness or left CVA tenderness.   Musculoskeletal:        Back:       Comments: Normal movement in all 4 extremities   Skin:     General: Skin is warm and dry.   Neurological:      Mental Status: She is alert.      Coordination: Coordination normal.   Psychiatric:         Mood and Affect: Mood normal.       DX CHEST  FINDINGS:  Minimal right basilar opacities.  No pulmonary infiltrates or consolidations are noted.  No " pleural effusions, no pneumothorax are appreciated.  Normal cardiopericardial silhouette.     IMPRESSION:     Minimal right basilar atelectasis.    UA Negative    Assessment/Plan:   Assessment    1. Right flank pain  - POCT Urinalysis    2. Shortness of breath  - DX-CHEST-2 VIEWS; Future    3. Atelectasis of right lung  - predniSONE (DELTASONE) 20 MG Tab; Take one tab by mouth twice daily for 5 days.  Dispense: 10 Tablet; Refill: 0  - albuterol 108 (90 Base) MCG/ACT Aero Soln inhalation aerosol; Inhale 2 Puffs every 6 hours as needed for Shortness of Breath.  Dispense: 8.5 g; Refill: 0  Symptoms and presentation most consistent with atelectasis likely secondary to Covid infection.  No signs of pneumonia or bacterial infection at this time.  No indication for antibiotics.  Discussed deep breathing exercises and possible side effects of medication with patient she is fully understanding and agreeable with plan.  Differential diagnosis, natural history, supportive care, and indications for immediate follow-up discussed.   Patient given instructions and understanding of medications and treatment.    If not improving in 3-5 days, F/U with PCP or return to  if symptoms worsen.    Patient agreeable to plan.      Please note that this dictation was created using voice recognition software. I have made every reasonable attempt to correct obvious errors, but I expect that there are errors of grammar and possibly content that I did not discover before finalizing the note.    Reuben Corley PA-C

## 2022-03-02 RX ORDER — OMEPRAZOLE 20 MG/1
CAPSULE, DELAYED RELEASE ORAL
Qty: 90 CAPSULE | Refills: 0 | Status: SHIPPED | OUTPATIENT
Start: 2022-03-02 | End: 2022-05-23

## 2022-03-02 NOTE — TELEPHONE ENCOUNTER
Received request via: Pharmacy    Was the patient seen in the last year in this department? Yes 9/22/21    Does the patient have an active prescription (recently filled or refills available) for medication(s) requested? No

## 2022-05-21 DIAGNOSIS — Z79.890 HORMONE REPLACEMENT THERAPY (HRT): ICD-10-CM

## 2022-05-23 RX ORDER — OMEPRAZOLE 20 MG/1
CAPSULE, DELAYED RELEASE ORAL
Qty: 90 CAPSULE | Refills: 0 | Status: SHIPPED | OUTPATIENT
Start: 2022-05-23 | End: 2022-11-02 | Stop reason: SDUPTHER

## 2022-05-23 RX ORDER — ESTRADIOL 0.03 MG/D
FILM, EXTENDED RELEASE TRANSDERMAL
Qty: 8 PATCH | Refills: 0 | Status: SHIPPED | OUTPATIENT
Start: 2022-05-23 | End: 2022-07-13 | Stop reason: SDUPTHER

## 2022-06-10 ENCOUNTER — TELEPHONE (OUTPATIENT)
Dept: CARDIOLOGY | Facility: MEDICAL CENTER | Age: 72
End: 2022-06-10
Payer: COMMERCIAL

## 2022-07-13 ENCOUNTER — OFFICE VISIT (OUTPATIENT)
Dept: MEDICAL GROUP | Facility: LAB | Age: 72
End: 2022-07-13
Payer: COMMERCIAL

## 2022-07-13 VITALS
BODY MASS INDEX: 26.66 KG/M2 | WEIGHT: 160 LBS | HEART RATE: 60 BPM | TEMPERATURE: 97.6 F | OXYGEN SATURATION: 95 % | RESPIRATION RATE: 14 BRPM | SYSTOLIC BLOOD PRESSURE: 164 MMHG | HEIGHT: 65 IN | DIASTOLIC BLOOD PRESSURE: 80 MMHG

## 2022-07-13 DIAGNOSIS — E78.5 DYSLIPIDEMIA: ICD-10-CM

## 2022-07-13 DIAGNOSIS — Z13.1 DIABETES MELLITUS SCREENING: ICD-10-CM

## 2022-07-13 DIAGNOSIS — I10 ESSENTIAL HYPERTENSION: ICD-10-CM

## 2022-07-13 DIAGNOSIS — Z79.890 HORMONE REPLACEMENT THERAPY (HRT): ICD-10-CM

## 2022-07-13 DIAGNOSIS — E55.9 HYPOVITAMINOSIS D: ICD-10-CM

## 2022-07-13 DIAGNOSIS — E78.2 MIXED HYPERLIPIDEMIA: ICD-10-CM

## 2022-07-13 PROCEDURE — 99213 OFFICE O/P EST LOW 20 MIN: CPT | Performed by: PHYSICIAN ASSISTANT

## 2022-07-13 PROCEDURE — 99999 PR NO CHARGE: CPT | Performed by: FAMILY MEDICINE

## 2022-07-13 RX ORDER — ESTRADIOL 0.03 MG/D
FILM, EXTENDED RELEASE TRANSDERMAL
Qty: 8 PATCH | Refills: 0 | Status: SHIPPED | OUTPATIENT
Start: 2022-07-13 | End: 2022-08-05

## 2022-07-13 RX ORDER — LOSARTAN POTASSIUM 100 MG/1
100 TABLET ORAL DAILY
Qty: 90 TABLET | Refills: 0 | Status: SHIPPED | OUTPATIENT
Start: 2022-07-13 | End: 2022-09-16

## 2022-07-13 ASSESSMENT — FIBROSIS 4 INDEX: FIB4 SCORE: 1.54

## 2022-07-13 NOTE — PROGRESS NOTES
"Subjective:     CC:     HPI:   Jacqueline here today with     No problems updated.      ROS:  Gen: no fevers/chills, no changes in weight  Eyes: no changes in vision  ENT: no sore throat, no hearing loss, no bloody nose  Pulm: no sob, no cough  CV: no chest pain, no palpitations  GI: no nausea/vomiting, no diarrhea  : no dysuria  MSk: no myalgias  Skin: no rash  Neuro: no headaches, no numbness/tingling  Heme/Lymph: no easy bruising    Current Outpatient Medications Ordered in Epic   Medication Sig Dispense Refill   • Estradiol (MICHAEL) 0.025 MG/24HR PATCH BIWEEKLY APPLY 1 PATCH TOPICALLY TWICE A WEEK 8 Patch 0   • omeprazole (PRILOSEC) 20 MG delayed-release capsule Take 1 capsule by mouth once daily 90 Capsule 0   • predniSONE (DELTASONE) 20 MG Tab Take one tab by mouth twice daily for 5 days. 10 Tablet 0   • albuterol 108 (90 Base) MCG/ACT Aero Soln inhalation aerosol Inhale 2 Puffs every 6 hours as needed for Shortness of Breath. 8.5 g 0   • estradiol (ESTRACE) 1 MG Tab Take 1/2 (one-half) tablet by mouth once daily 15 tablet 3   • Cholecalciferol 1.25 MG (26354 UT) Tab VITAMIN D TABLET     • therapeutic multivitamin-minerals (THERAGRAN-M) TABS Take 1 Tab by mouth every day.       No current [facility]-ordered facility-administered medications on file.       Health Maintenance: Completed    Objective:     Exam:  BP (!) 164/80 (BP Location: Right arm, Patient Position: Sitting, BP Cuff Size: Adult)   Pulse 60   Temp 36.4 °C (97.6 °F)   Resp 14   Ht 1.651 m (5' 5\")   Wt 72.6 kg (160 lb)   LMP  (LMP Unknown)   SpO2 95%   BMI 26.63 kg/m²  Body mass index is 26.63 kg/m².    Gen: Alert and oriented, No apparent distress.  Neck: Neck is supple without lymphadenopathy.  Lungs: Normal effort, CTA bilaterally, no wheezes, rhonchi, or rales  CV: Regular rate and rhythm. No murmurs, rubs, or gallops.  Ext: No clubbing, cyanosis, edema.    A chaperone was offered to the patient during today's exam.     Labs:     Assessment & " Plan:     72 y.o. female with the following -     Problem List Items Addressed This Visit    None         I spent a total of  minutes with record review, exam, communication with the patient, communication with other providers, and documentation of this encounter.      No follow-ups on file.    Please note that this dictation was created using voice recognition software. I have made every reasonable attempt to correct obvious errors, but there may be errors of grammar and possibly content that I did not discover before finalizing the note.

## 2022-07-13 NOTE — PROGRESS NOTES
Subjective:     CC: med refills    HPI:   Jacqueline here today with     Prev pt of Dr. Toney, has appt to establish with Dr. Benites 08/05/2022. Pt is requesting bridge presciptions of her losartan and estradiol until she can be evaluated by her new PCP.     BP in clinic today is elevated as it has been several days since pt has had BP medication. Denies CP, SOB, palpitations, syncope, presyncope.     Pt is also requesting screening labs for upcoming appt    ROS:  Gen: no fevers/chills,   Pulm: no sob, no cough  CV: no chest pain, no palpitations  GI: no nausea/vomiting, no diarrhea  MSk: no myalgias      Current Outpatient Medications Ordered in Epic   Medication Sig Dispense Refill   • losartan (COZAAR) 100 MG Tab Take 1 Tablet by mouth every day. 90 Tablet 0   • Estradiol (MICHAEL) 0.025 MG/24HR PATCH BIWEEKLY APPLY 1 PATCH TOPICALLY TWICE A WEEK 8 Patch 0   • omeprazole (PRILOSEC) 20 MG delayed-release capsule Take 1 capsule by mouth once daily 90 Capsule 0   • predniSONE (DELTASONE) 20 MG Tab Take one tab by mouth twice daily for 5 days. 10 Tablet 0   • albuterol 108 (90 Base) MCG/ACT Aero Soln inhalation aerosol Inhale 2 Puffs every 6 hours as needed for Shortness of Breath. 8.5 g 0   • estradiol (ESTRACE) 1 MG Tab Take 1/2 (one-half) tablet by mouth once daily 15 tablet 3   • Cholecalciferol 1.25 MG (68383 UT) Tab VITAMIN D TABLET     • therapeutic multivitamin-minerals (THERAGRAN-M) TABS Take 1 Tab by mouth every day.       No current [facility]-ordered facility-administered medications on file.         Objective:     Exam:  LMP  (LMP Unknown)  There is no height or weight on file to calculate BMI.    Gen: Alert and oriented, No apparent distress.  Neck: Neck is supple without lymphadenopathy.  Lungs: Normal effort, CTA bilaterally, no wheezes, rhonchi, or rales  CV: Regular rate and rhythm. No murmurs, rubs, or gallops.  Ext: No clubbing, cyanosis, edema.        Assessment & Plan:     72 y.o. female with the following -      1. Essential hypertension  This is a chronic condition, poorly controlled due to running out of medication.   Resume losartan 100mg QD, instructed pt to monitor BP    2. Hypovitaminosis D  Chronic condition  Continue current medication  Due for updated labs    3. Mixed hyperlipidemia  Chronic condition, poorly controlled  Pt has tried statin medications in the past but has been unable to tolerate them. She is interested in discussing trial of injectable such as Repatha with her PCP      I spent a total of 15 minutes with record review, exam, communication with the patient, communication with other providers, and documentation of this encounter.      No follow-ups on file.    Please note that this dictation was created using voice recognition software. I have made every reasonable attempt to correct obvious errors, but there may be errors of grammar and possibly content that I did not discover before finalizing the note.

## 2022-07-30 LAB
25(OH)D3+25(OH)D2 SERPL-MCNC: 32.4 NG/ML (ref 30–100)
ALBUMIN SERPL-MCNC: 4.8 G/DL (ref 3.7–4.7)
ALBUMIN/GLOB SERPL: 1.9 {RATIO} (ref 1.2–2.2)
ALP SERPL-CCNC: 73 IU/L (ref 44–121)
ALT SERPL-CCNC: 25 IU/L (ref 0–32)
AST SERPL-CCNC: 22 IU/L (ref 0–40)
BASOPHILS # BLD AUTO: 0 X10E3/UL (ref 0–0.2)
BASOPHILS NFR BLD AUTO: 0 %
BILIRUB SERPL-MCNC: 0.5 MG/DL (ref 0–1.2)
BUN SERPL-MCNC: 12 MG/DL (ref 8–27)
BUN/CREAT SERPL: 14 (ref 12–28)
CALCIUM SERPL-MCNC: 9.7 MG/DL (ref 8.7–10.3)
CHLORIDE SERPL-SCNC: 104 MMOL/L (ref 96–106)
CHOLEST SERPL-MCNC: 316 MG/DL (ref 100–199)
CO2 SERPL-SCNC: 22 MMOL/L (ref 20–29)
CREAT SERPL-MCNC: 0.88 MG/DL (ref 0.57–1)
EGFRCR SERPLBLD CKD-EPI 2021: 70 ML/MIN/1.73
EOSINOPHIL # BLD AUTO: 0.1 X10E3/UL (ref 0–0.4)
EOSINOPHIL NFR BLD AUTO: 2 %
ERYTHROCYTE [DISTWIDTH] IN BLOOD BY AUTOMATED COUNT: 13.3 % (ref 11.7–15.4)
GLOBULIN SER CALC-MCNC: 2.5 G/DL (ref 1.5–4.5)
GLUCOSE SERPL-MCNC: 87 MG/DL (ref 65–99)
HBA1C MFR BLD: 5.6 % (ref 4.8–5.6)
HCT VFR BLD AUTO: 49.5 % (ref 34–46.6)
HDLC SERPL-MCNC: 64 MG/DL
HGB BLD-MCNC: 16.2 G/DL (ref 11.1–15.9)
IMM GRANULOCYTES # BLD AUTO: 0 X10E3/UL (ref 0–0.1)
IMM GRANULOCYTES NFR BLD AUTO: 0 %
IMMATURE CELLS  115398: ABNORMAL
LABORATORY COMMENT REPORT: ABNORMAL
LDLC SERPL CALC-MCNC: 222 MG/DL (ref 0–99)
LYMPHOCYTES # BLD AUTO: 2.5 X10E3/UL (ref 0.7–3.1)
LYMPHOCYTES NFR BLD AUTO: 52 %
MCH RBC QN AUTO: 30.7 PG (ref 26.6–33)
MCHC RBC AUTO-ENTMCNC: 32.7 G/DL (ref 31.5–35.7)
MCV RBC AUTO: 94 FL (ref 79–97)
MONOCYTES # BLD AUTO: 0.4 X10E3/UL (ref 0.1–0.9)
MONOCYTES NFR BLD AUTO: 7 %
MORPHOLOGY BLD-IMP: ABNORMAL
NEUTROPHILS # BLD AUTO: 1.9 X10E3/UL (ref 1.4–7)
NEUTROPHILS NFR BLD AUTO: 39 %
NRBC BLD AUTO-RTO: ABNORMAL %
PLATELET # BLD AUTO: 264 X10E3/UL (ref 150–450)
POTASSIUM SERPL-SCNC: 4.6 MMOL/L (ref 3.5–5.2)
PROT SERPL-MCNC: 7.3 G/DL (ref 6–8.5)
RBC # BLD AUTO: 5.27 X10E6/UL (ref 3.77–5.28)
SODIUM SERPL-SCNC: 142 MMOL/L (ref 134–144)
TRIGL SERPL-MCNC: 159 MG/DL (ref 0–149)
TSH SERPL DL<=0.005 MIU/L-ACNC: 0.81 UIU/ML (ref 0.45–4.5)
VLDLC SERPL CALC-MCNC: 30 MG/DL (ref 5–40)
WBC # BLD AUTO: 4.8 X10E3/UL (ref 3.4–10.8)

## 2022-08-05 ENCOUNTER — OFFICE VISIT (OUTPATIENT)
Dept: MEDICAL GROUP | Facility: LAB | Age: 72
End: 2022-08-05
Payer: COMMERCIAL

## 2022-08-05 VITALS
OXYGEN SATURATION: 96 % | HEIGHT: 65 IN | RESPIRATION RATE: 16 BRPM | DIASTOLIC BLOOD PRESSURE: 64 MMHG | TEMPERATURE: 97.4 F | HEART RATE: 60 BPM | BODY MASS INDEX: 26.66 KG/M2 | WEIGHT: 160 LBS | SYSTOLIC BLOOD PRESSURE: 110 MMHG

## 2022-08-05 DIAGNOSIS — Z23 NEED FOR TDAP VACCINATION: ICD-10-CM

## 2022-08-05 DIAGNOSIS — E78.5 HYPERLIPIDEMIA, UNSPECIFIED HYPERLIPIDEMIA TYPE: ICD-10-CM

## 2022-08-05 DIAGNOSIS — Z12.83 SCREENING FOR SKIN CANCER: ICD-10-CM

## 2022-08-05 DIAGNOSIS — R10.13 EPIGASTRIC PAIN: ICD-10-CM

## 2022-08-05 DIAGNOSIS — Z76.89 ENCOUNTER TO ESTABLISH CARE WITH NEW DOCTOR: ICD-10-CM

## 2022-08-05 PROCEDURE — 99214 OFFICE O/P EST MOD 30 MIN: CPT | Mod: 25 | Performed by: FAMILY MEDICINE

## 2022-08-05 PROCEDURE — 90471 IMMUNIZATION ADMIN: CPT | Performed by: FAMILY MEDICINE

## 2022-08-05 PROCEDURE — 90715 TDAP VACCINE 7 YRS/> IM: CPT | Performed by: FAMILY MEDICINE

## 2022-08-05 RX ORDER — SUCRALFATE 1 G/1
1 TABLET ORAL
Qty: 120 TABLET | Refills: 3 | Status: SHIPPED | OUTPATIENT
Start: 2022-08-05 | End: 2023-02-03

## 2022-08-05 RX ORDER — ATORVASTATIN CALCIUM 20 MG/1
20 TABLET, FILM COATED ORAL NIGHTLY
Qty: 30 TABLET | Refills: 6 | Status: SHIPPED | OUTPATIENT
Start: 2022-08-05 | End: 2022-11-08 | Stop reason: SDUPTHER

## 2022-08-05 ASSESSMENT — PATIENT HEALTH QUESTIONNAIRE - PHQ9: CLINICAL INTERPRETATION OF PHQ2 SCORE: 0

## 2022-08-05 ASSESSMENT — FIBROSIS 4 INDEX: FIB4 SCORE: 1.2

## 2022-08-05 NOTE — PROGRESS NOTES
CC: Here to establish care    HPI: Established patient, new to me  Jacqueline presents today to establish care, 72 years old female, with past medical history significant for hyperlipidemia, hypertension, history of TIA, persistent GERD  Discussed the following today:    1. Encounter to establish care with new doctor  As part of her establish care visit reviewed records, past medical problems, past surgical history, family/social history and medications today.  Patient is an RN and works at the [employer]    2. Hyperlipidemia, unspecified hyperlipidemia type  Chronic, new to me not taking statins, she said she was on Crestor in the past and it caused her some epigastric discomfort so she stopped it on her own  Reviewed most recent lipid profile, LDL at 122  Patient has history of TIAs    3. Epigastric pain  Chronic, has been associated with GERD symptoms and acid reflux, she said recently she has been having more epigastric discomfort GERD symptoms and high acidity associated with sometimes chest pain.  Uses Prilosec on and off and that helps her pain.  Denies nausea or vomiting or change in the color of stool or blood in stool    4. Need for Tdap vaccination  Needs Tdap today    5. Screening for skin cancer  Patient needs referral to see dermatology for skin cancer screening      Patient Active Problem List    Diagnosis Date Noted   • Encounter to establish care with new doctor 08/05/2022   • Hypovitaminosis D 01/31/2019   • Health care maintenance 01/31/2019   • Essential hypertension 12/14/2017   • Mixed hyperlipidemia 01/30/2014   • Vitamin D deficiency disease 01/30/2014       Current Outpatient Medications   Medication Sig Dispense Refill   • atorvastatin (LIPITOR) 20 MG Tab Take 1 Tablet by mouth every evening. 30 Tablet 6   • sucralfate (CARAFATE) 1 GM Tab Take 1 Tablet by mouth 4 Times a Day,Before Meals and at Bedtime. 120 Tablet 3   • losartan (COZAAR) 100 MG Tab Take 1 Tablet by mouth every day. 90 Tablet 0    • Estradiol (MICHAEL) 0.025 MG/24HR PATCH BIWEEKLY APPLY 1 PATCH TOPICALLY TWICE A WEEK 8 Patch 0   • omeprazole (PRILOSEC) 20 MG delayed-release capsule Take 1 capsule by mouth once daily 90 Capsule 0   • predniSONE (DELTASONE) 20 MG Tab Take one tab by mouth twice daily for 5 days. (Patient not taking: Reported on 8/5/2022) 10 Tablet 0   • albuterol 108 (90 Base) MCG/ACT Aero Soln inhalation aerosol Inhale 2 Puffs every 6 hours as needed for Shortness of Breath. 8.5 g 0   • estradiol (ESTRACE) 1 MG Tab Take 1/2 (one-half) tablet by mouth once daily 15 tablet 3   • Cholecalciferol 1.25 MG (11155 UT) Tab VITAMIN D TABLET     • therapeutic multivitamin-minerals (THERAGRAN-M) TABS Take 1 Tab by mouth every day.       No current facility-administered medications for this visit.         Allergies as of 08/05/2022 - Reviewed 08/05/2022   Allergen Reaction Noted   • Clindamycin Hives and Contact Dermatitis 06/26/2008   • Erythromycin [akne-mycin]  08/25/2013   • Lisinopril  12/14/2017        ROS: Denies any chest pain, Shortness of breath, Changes bowel or bladder, Lower extremity edema.    Physical Exam:  Gen.: Well-developed, well-nourished, no apparent distress,pleasant and cooperative with the examination  Skin:  Warm and dry with good turgor. No rashes or suspicious lesions in visible areas  Eye: PERRLA, conjunctiva and sclera clear, lids normal  HEENT: Normocephalic/atraumatic, sinuses nontender with palpation, TMs clear, nares patent with pink mucosa and clear rhinorrhea, lips without lesions, oropharynx clear.  Neck: Trachea midline,no masses or adenopathy  Thyroid: normal consistency and size. No masses or nodules. Not tender with palpation.  Cor: Regular rate and rhythm without murmur, gallop or rub.  Lungs: Respirations unlabored.Clear to auscultation with equal breath sounds bilaterally. No wheezes, rhonchi.  Abdomen: Soft nontender without hepatosplenomegaly or masses appreciated, normoactive bowel sounds. No  hernias.  Extremities: No cyanosis, clubbing or edema, Symmetrical without deformities or malformations. Pulses 2+ and symmetrical both upper and lower extremities  Lymphatic: No abnormal adenopathy of the neck groin or axillae.  Psych: Alert and oriented x 3.Normal affect, judgement,insight and memory.        Assessment and Plan.   72 y.o. female *here to establish care    1. Encounter to establish care with new doctor  Reviewed medical history as above    2. Hyperlipidemia, unspecified hyperlipidemia type  Chronic uncontrolled, discussed with the patient elevated cardiac risk, advised to do calcium cardiac scoring I will start patient on atorvastatin and will increase the dose as she tolerates the 20 mg.  - CT-HEART W/O CONT EVAL CALCIUM; Future  - atorvastatin (LIPITOR) 20 MG Tab; Take 1 Tablet by mouth every evening.  Dispense: 30 Tablet; Refill: 6    3. Epigastric pain  Chronic for the patient, new to me, getting worse.  With GERD symptoms and acid reflux  Differential diagnosis includes GERD, duodenal ulcer disease, severe gastritis, and will rule out gallbladder disease.  Advised to do work-up, start medications Prilosec and Carafate as directed and follow-up with gastroenterology for further evaluation, no red flags at this time.  If any patient to report to ER  - sucralfate (CARAFATE) 1 GM Tab; Take 1 Tablet by mouth 4 Times a Day,Before Meals and at Bedtime.  Dispense: 120 Tablet; Refill: 3  - Referral to Gastroenterology  - -RUQ; Future    4. Need for Tdap vaccination    - Tdap =>6yo IM    5. Screening for skin cancer    - Referral to Dermatology       Please note that this dictation was created using voice recognition software. I have made every reasonable attempt to correct obvious errors but there may be errors of grammar and content that I may have overlooked prior to finalization of this note.

## 2022-08-22 ENCOUNTER — APPOINTMENT (OUTPATIENT)
Dept: RADIOLOGY | Facility: MEDICAL CENTER | Age: 72
End: 2022-08-22
Attending: FAMILY MEDICINE
Payer: COMMERCIAL

## 2022-08-31 DIAGNOSIS — Z79.890 HORMONE REPLACEMENT THERAPY (HRT): ICD-10-CM

## 2022-09-01 RX ORDER — ESTRADIOL 0.03 MG/D
FILM, EXTENDED RELEASE TRANSDERMAL
Qty: 8 PATCH | Refills: 0 | Status: SHIPPED | OUTPATIENT
Start: 2022-09-01 | End: 2022-12-29

## 2022-09-12 ENCOUNTER — HOSPITAL ENCOUNTER (OUTPATIENT)
Dept: RADIOLOGY | Facility: MEDICAL CENTER | Age: 72
End: 2022-09-12
Attending: FAMILY MEDICINE
Payer: COMMERCIAL

## 2022-09-12 DIAGNOSIS — R10.13 EPIGASTRIC PAIN: ICD-10-CM

## 2022-09-12 PROCEDURE — 76705 ECHO EXAM OF ABDOMEN: CPT

## 2022-10-04 ENCOUNTER — APPOINTMENT (OUTPATIENT)
Dept: RADIOLOGY | Facility: MEDICAL CENTER | Age: 72
End: 2022-10-04
Attending: FAMILY MEDICINE
Payer: COMMERCIAL

## 2022-10-10 ENCOUNTER — HOSPITAL ENCOUNTER (OUTPATIENT)
Dept: RADIOLOGY | Facility: MEDICAL CENTER | Age: 72
End: 2022-10-10
Attending: FAMILY MEDICINE
Payer: COMMERCIAL

## 2022-10-10 DIAGNOSIS — E78.5 HYPERLIPIDEMIA, UNSPECIFIED HYPERLIPIDEMIA TYPE: ICD-10-CM

## 2022-10-10 PROCEDURE — 4410556 CT-CARDIAC SCORING (SELF PAY ONLY)

## 2022-11-02 NOTE — TELEPHONE ENCOUNTER
Received request via: Pharmacy    Was the patient seen in the last year in this department? Yes  8/5/22  Does the patient have an active prescription (recently filled or refills available) for medication(s) requested? No

## 2022-11-03 RX ORDER — OMEPRAZOLE 20 MG/1
20 CAPSULE, DELAYED RELEASE ORAL DAILY
Qty: 90 CAPSULE | Refills: 0 | Status: SHIPPED | OUTPATIENT
Start: 2022-11-03 | End: 2023-04-12

## 2022-11-08 ENCOUNTER — HOSPITAL ENCOUNTER (OUTPATIENT)
Facility: MEDICAL CENTER | Age: 72
End: 2022-11-08
Attending: FAMILY MEDICINE
Payer: COMMERCIAL

## 2022-11-08 ENCOUNTER — OFFICE VISIT (OUTPATIENT)
Dept: MEDICAL GROUP | Facility: LAB | Age: 72
End: 2022-11-08
Payer: COMMERCIAL

## 2022-11-08 VITALS
BODY MASS INDEX: 26.66 KG/M2 | HEART RATE: 68 BPM | OXYGEN SATURATION: 96 % | SYSTOLIC BLOOD PRESSURE: 120 MMHG | WEIGHT: 160 LBS | DIASTOLIC BLOOD PRESSURE: 70 MMHG | HEIGHT: 65 IN | TEMPERATURE: 97.2 F | RESPIRATION RATE: 16 BRPM

## 2022-11-08 DIAGNOSIS — I10 ESSENTIAL HYPERTENSION: ICD-10-CM

## 2022-11-08 DIAGNOSIS — R82.90 ABNORMAL URINE: ICD-10-CM

## 2022-11-08 DIAGNOSIS — E78.5 HYPERLIPIDEMIA, UNSPECIFIED HYPERLIPIDEMIA TYPE: ICD-10-CM

## 2022-11-08 DIAGNOSIS — K59.00 CONSTIPATION, UNSPECIFIED CONSTIPATION TYPE: ICD-10-CM

## 2022-11-08 DIAGNOSIS — R10.30 LOWER ABDOMINAL PAIN: ICD-10-CM

## 2022-11-08 DIAGNOSIS — R42 DIZZINESS: ICD-10-CM

## 2022-11-08 LAB
APPEARANCE UR: CLEAR
BILIRUB UR STRIP-MCNC: NORMAL MG/DL
COLOR UR AUTO: YELLOW
GLUCOSE UR STRIP.AUTO-MCNC: NORMAL MG/DL
KETONES UR STRIP.AUTO-MCNC: NORMAL MG/DL
LEUKOCYTE ESTERASE UR QL STRIP.AUTO: NORMAL
NITRITE UR QL STRIP.AUTO: NORMAL
PH UR STRIP.AUTO: 6 [PH] (ref 5–8)
PROT UR QL STRIP: NORMAL MG/DL
RBC UR QL AUTO: NORMAL
SP GR UR STRIP.AUTO: 1
UROBILINOGEN UR STRIP-MCNC: 0.2 MG/DL

## 2022-11-08 PROCEDURE — 81002 URINALYSIS NONAUTO W/O SCOPE: CPT | Performed by: FAMILY MEDICINE

## 2022-11-08 PROCEDURE — 99214 OFFICE O/P EST MOD 30 MIN: CPT | Performed by: FAMILY MEDICINE

## 2022-11-08 PROCEDURE — 87086 URINE CULTURE/COLONY COUNT: CPT

## 2022-11-08 RX ORDER — DOCUSATE SODIUM 100 MG/1
100 CAPSULE, LIQUID FILLED ORAL 2 TIMES DAILY
Qty: 60 CAPSULE | Refills: 3 | Status: SHIPPED | OUTPATIENT
Start: 2022-11-08

## 2022-11-08 RX ORDER — ATORVASTATIN CALCIUM 20 MG/1
20 TABLET, FILM COATED ORAL NIGHTLY
Qty: 90 TABLET | Refills: 3 | Status: SHIPPED | OUTPATIENT
Start: 2022-11-08 | End: 2023-12-07

## 2022-11-08 RX ORDER — LOSARTAN POTASSIUM 100 MG/1
100 TABLET ORAL DAILY
Qty: 90 TABLET | Refills: 0 | Status: SHIPPED | OUTPATIENT
Start: 2022-11-08 | End: 2023-07-17

## 2022-11-08 ASSESSMENT — FIBROSIS 4 INDEX: FIB4 SCORE: 1.2

## 2022-11-08 NOTE — PROGRESS NOTES
Chief Complaint:   Chief Complaint   Patient presents with    Pain     Right groin area       HPI:Established patient   Jacqueline Woodard is a 72 y.o. female who presents for follow-up, discussed the following today:    1. Lower abdominal pain  New concern  Started around 4 weeks ago, patient reports that the pain is intermittent on and off, patient said she just all of a sudden feels sharp pain in the lower abdominal area on the right side.  That resolved spontaneously.  Reports chronic constipation, no nausea vomiting or diarrhea, no fever, reports also mild urinary symptoms described as discomfort with urination and possible burning    2. Constipation, unspecified constipation type  Chronic ongoing, associated with some lower abdominal discomfort as described above, uses over-the-counter medication on and off not regular    3. Hyperlipidemia, unspecified hyperlipidemia type  Discussed with the patient calcium cardiac score, advised to continue with statins and low-dose of aspirin to prevent cardiovascular events.    4. Abnormal urine  Reports mild discomfort in the urine that started few days ago described as possible burning sensation.  As described above    5. Essential hypertension    Blood pressure at the office today within normal limits 120/70, requesting to refill her medication,  6.dizziness    New to me, ongoing for the patient  Reports episodes of dizziness and blurring of vision that resolved spontaneously without chest pain or palpitations.  Past medical history, family history, social history and medications reviewed and updated in the record.  Today  Current medications, problem list and allergies reviewed in [facility] today  Health maintenance topics are reviewed and updated.    Patient Active Problem List    Diagnosis Date Noted    Encounter to establish care with new doctor 08/05/2022    Hypovitaminosis D 01/31/2019    Health care maintenance 01/31/2019    Essential hypertension 12/14/2017    Mixed  hyperlipidemia 01/30/2014    Vitamin D deficiency disease 01/30/2014     Family History   Problem Relation Age of Onset    Cancer Mother         advanced ? Ovarian    Hypertension Mother     Hyperlipidemia Mother     Psychiatric Illness Mother     Diabetes Mother     Obesity Mother     Cancer Father         colon    Seizures Sister     Cancer Sister         ovarian    Cancer Brother         bladder, smoker    Cancer Brother         bladder, smoker    Cancer Brother         lungs, smoker     Social History     Socioeconomic History    Marital status: Single     Spouse name: Not on file    Number of children: Not on file    Years of education: Not on file    Highest education level: Not on file   Occupational History    Not on file   Tobacco Use    Smoking status: Never    Smokeless tobacco: Never   Vaping Use    Vaping Use: Never used   Substance and Sexual Activity    Alcohol use: Yes     Comment: occassinally    Drug use: No    Sexual activity: Not Currently     Partners: Male   Other Topics Concern    Not on file   Social History Narrative    ** Merged History Encounter **          Social Determinants of Health     Financial Resource Strain: Not on file   Food Insecurity: Not on file   Transportation Needs: Not on file   Physical Activity: Not on file   Stress: Not on file   Social Connections: Not on file   Intimate Partner Violence: Not on file   Housing Stability: Not on file     Current Outpatient Medications   Medication Sig Dispense Refill    docusate sodium (COLACE) 100 MG Cap Take 1 Capsule by mouth 2 times a day. 60 Capsule 3    atorvastatin (LIPITOR) 20 MG Tab Take 1 Tablet by mouth every evening. 90 Tablet 3    losartan (COZAAR) 100 MG Tab Take 1 Tablet by mouth every day. 90 Tablet 0    omeprazole (PRILOSEC) 20 MG delayed-release capsule Take 1 Capsule by mouth every day. 90 Capsule 0    Estradiol (MICHAEL) 0.025 MG/24HR PATCH BIWEEKLY APPLY 1 PATCH  TOPICALLY TWICE A WEEK 8 Patch 0    sucralfate  "(CARAFATE) 1 GM Tab Take 1 Tablet by mouth 4 Times a Day,Before Meals and at Bedtime. 120 Tablet 3    predniSONE (DELTASONE) 20 MG Tab Take one tab by mouth twice daily for 5 days. 10 Tablet 0    albuterol 108 (90 Base) MCG/ACT Aero Soln inhalation aerosol Inhale 2 Puffs every 6 hours as needed for Shortness of Breath. 8.5 g 0    estradiol (ESTRACE) 1 MG Tab Take 1/2 (one-half) tablet by mouth once daily 15 tablet 3    Cholecalciferol 1.25 MG (82188 UT) Tab VITAMIN D TABLET      therapeutic multivitamin-minerals (THERAGRAN-M) TABS Take 1 Tab by mouth every day.       No current facility-administered medications for this visit.           Review Of Systems  As documented in HPI above  PHYSICAL EXAMINATION:    /70 (BP Location: Left arm, Patient Position: Sitting, BP Cuff Size: Adult)   Pulse 68   Temp 36.2 °C (97.2 °F) (Temporal)   Resp 16   Ht 1.651 m (5' 5\")   Wt 72.6 kg (160 lb)   LMP  (LMP Unknown)   SpO2 96%   BMI 26.63 kg/m²   Gen.: Well-developed, well-nourished, no apparent distress, pleasant and cooperative with the examination  HEENT: Normocephalic/atraumatic, Neck: No JVD or bruits, no adenopathy  Cor: Regular rate and rhythm without murmur gallop or rub  Lungs: Clear to auscultation with equal breath sounds bilaterally. No wheezes, rhonchi.  Abdomen: Soft nontender without hepatosplenomegaly or masses appreciated, normoactive bowel sounds, no rebound tenderness.  Extremities: No cyanosis, clubbing or edema       ASSESSMENT/Plan:  1. Lower abdominal pain  New concern, differential diagnosis includes constipation.  Discussed with the patient treatment of constipation and advised to do an ultrasound for further evaluation of ovarian pathology gynecology pathology.  No red flags benign clinical exam today, if worsening of symptoms to report to ER  US-PELVIC COMPLETE (TRANSABDOMINAL/TRANSVAGINAL) (COMBO)    POCT Urinalysis      2. Constipation, unspecified constipation type  Chronic ongoing for " the patient.  Advised to increase water intake and fiber in diet, start regular use of Colace twice a day, will add MiraLAX or Senokot if not improved.    Docusate sodium (COLACE) 100 MG Cap      3. Hyperlipidemia, unspecified hyperlipidemia type  Chronic, elevated LDL with elevated cardiac risk as per calcium cardiac score advised to continue with atorvastatin and low-dose of aspirin    atorvastatin (LIPITOR) 20 MG Tab      4. Abnormal urine  Urine dip within normal limits, will send for culture for further evaluation    URINE CULTURE(NEW)    US-PELVIC COMPLETE (TRANSABDOMINAL/TRANSVAGINAL) (COMBO)    POCT Urinalysis      5. Essential hypertension  Chronic well-controlled continue current regimen refilled medication    losartan (COZAAR) 100 MG Tab      6. Dizziness  New concern, benign clinical exam.  Follow-up with cardiology for Zio patch and to rule out arrhythmia.  Recurrent associated with blurring of vision.  REFERRAL TO CARDIOLOGY         Please note that this dictation was created using voice recognition software. I have made every reasonable attempt to correct obvious errors but there may be errors of grammar and content that I may have overlooked prior to finalization of this note.

## 2022-11-10 LAB
BACTERIA UR CULT: NORMAL
SIGNIFICANT IND 70042: NORMAL
SITE SITE: NORMAL
SOURCE SOURCE: NORMAL

## 2022-12-20 ENCOUNTER — HOSPITAL ENCOUNTER (OUTPATIENT)
Dept: RADIOLOGY | Facility: MEDICAL CENTER | Age: 72
End: 2022-12-20
Attending: FAMILY MEDICINE
Payer: COMMERCIAL

## 2022-12-20 DIAGNOSIS — R10.30 LOWER ABDOMINAL PAIN: ICD-10-CM

## 2022-12-20 DIAGNOSIS — R82.90 ABNORMAL URINE: ICD-10-CM

## 2022-12-20 PROCEDURE — 76830 TRANSVAGINAL US NON-OB: CPT

## 2022-12-28 DIAGNOSIS — Z79.890 HORMONE REPLACEMENT THERAPY (HRT): ICD-10-CM

## 2022-12-29 RX ORDER — ESTRADIOL 0.03 MG/D
FILM, EXTENDED RELEASE TRANSDERMAL
Qty: 8 PATCH | Refills: 0 | Status: SHIPPED | OUTPATIENT
Start: 2022-12-29 | End: 2023-02-09

## 2022-12-29 NOTE — TELEPHONE ENCOUNTER
Received request via: Pharmacy    Was the patient seen in the last year in this department? Yes  11/8/2022  Does the patient have an active prescription (recently filled or refills available) for medication(s) requested? No    Does the patient have skilled nursing Plus and need 100 day supply (blood pressure, diabetes and cholesterol meds only)? Patient does not have SCP

## 2023-02-03 ENCOUNTER — OFFICE VISIT (OUTPATIENT)
Dept: CARDIOLOGY | Facility: MEDICAL CENTER | Age: 73
End: 2023-02-03
Payer: COMMERCIAL

## 2023-02-03 VITALS
RESPIRATION RATE: 16 BRPM | HEIGHT: 65 IN | HEART RATE: 63 BPM | DIASTOLIC BLOOD PRESSURE: 72 MMHG | WEIGHT: 160 LBS | SYSTOLIC BLOOD PRESSURE: 132 MMHG | OXYGEN SATURATION: 96 % | BODY MASS INDEX: 26.66 KG/M2

## 2023-02-03 DIAGNOSIS — R42 DIZZINESS: ICD-10-CM

## 2023-02-03 LAB — EKG IMPRESSION: NORMAL

## 2023-02-03 PROCEDURE — 93000 ELECTROCARDIOGRAM COMPLETE: CPT | Performed by: INTERNAL MEDICINE

## 2023-02-03 PROCEDURE — 99213 OFFICE O/P EST LOW 20 MIN: CPT | Mod: 25 | Performed by: INTERNAL MEDICINE

## 2023-02-03 ASSESSMENT — ENCOUNTER SYMPTOMS
COUGH: 0
BLURRED VISION: 1
MYALGIAS: 0
DIZZINESS: 1
LOSS OF CONSCIOUSNESS: 0
PALPITATIONS: 0
SHORTNESS OF BREATH: 0

## 2023-02-03 ASSESSMENT — FIBROSIS 4 INDEX: FIB4 SCORE: 1.2
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
## 2023-02-08 DIAGNOSIS — Z79.890 HORMONE REPLACEMENT THERAPY (HRT): ICD-10-CM

## 2023-02-08 NOTE — TELEPHONE ENCOUNTER
Received request via: Pharmacy    Was the patient seen in the last year in this department? Yes  LOV : 2/3/2023   Does the patient have an active prescription (recently filled or refills available) for medication(s) requested? No    Does the patient have care home Plus and need 100 day supply (blood pressure, diabetes and cholesterol meds only)? Patient does not have SCP

## 2023-02-09 ENCOUNTER — APPOINTMENT (OUTPATIENT)
Dept: CARDIOLOGY | Facility: MEDICAL CENTER | Age: 73
End: 2023-02-09
Attending: INTERNAL MEDICINE
Payer: COMMERCIAL

## 2023-02-09 RX ORDER — ESTRADIOL 0.03 MG/D
FILM, EXTENDED RELEASE TRANSDERMAL
Qty: 8 PATCH | Refills: 0 | Status: SHIPPED | OUTPATIENT
Start: 2023-02-09 | End: 2023-04-25

## 2023-02-10 ENCOUNTER — TELEPHONE (OUTPATIENT)
Dept: HEALTH INFORMATION MANAGEMENT | Facility: OTHER | Age: 73
End: 2023-02-10
Payer: COMMERCIAL

## 2023-04-12 RX ORDER — OMEPRAZOLE 20 MG/1
CAPSULE, DELAYED RELEASE ORAL
Qty: 90 CAPSULE | Refills: 0 | Status: SHIPPED | OUTPATIENT
Start: 2023-04-12 | End: 2023-07-17

## 2023-04-12 NOTE — TELEPHONE ENCOUNTER
Received request via: Pharmacy    Was the patient seen in the last year in this department? Yes  LOV 11/08/2022  Does the patient have an active prescription (recently filled or refills available) for medication(s) requested? No    Does the patient have halfway Plus and need 100 day supply (blood pressure, diabetes and cholesterol meds only)? Patient does not have SCP

## 2023-04-25 DIAGNOSIS — Z79.890 HORMONE REPLACEMENT THERAPY (HRT): ICD-10-CM

## 2023-04-25 RX ORDER — ESTRADIOL 0.03 MG/D
FILM, EXTENDED RELEASE TRANSDERMAL
Qty: 8 PATCH | Refills: 0 | Status: SHIPPED | OUTPATIENT
Start: 2023-04-25 | End: 2023-06-01

## 2023-04-26 NOTE — TELEPHONE ENCOUNTER
MEDICATION DENIED LAST WEEK.  MESSAGE SENT TO PATIENT TO SCHEDULE FOLLOWUP APPT.    Received request via: Patient    Was the patient seen in the last year in this department? Yes    Does the patient have an active prescription (recently filled or refills available) for medication(s) requested? No    Does the patient have snf Plus and need 100 day supply (blood pressure, diabetes and cholesterol meds only)? Patient does not have SCP

## 2023-04-27 ENCOUNTER — TELEPHONE (OUTPATIENT)
Dept: MEDICAL GROUP | Facility: LAB | Age: 73
End: 2023-04-27
Payer: COMMERCIAL

## 2023-04-27 DIAGNOSIS — Z79.890 HORMONE REPLACEMENT THERAPY (HRT): ICD-10-CM

## 2023-04-27 RX ORDER — ESTRADIOL 0.03 MG/D
FILM, EXTENDED RELEASE TRANSDERMAL
Qty: 8 PATCH | Refills: 0 | Status: CANCELLED | OUTPATIENT
Start: 2023-04-27

## 2023-06-01 ENCOUNTER — OFFICE VISIT (OUTPATIENT)
Dept: MEDICAL GROUP | Facility: LAB | Age: 73
End: 2023-06-01
Payer: COMMERCIAL

## 2023-06-01 VITALS
TEMPERATURE: 98.2 F | HEIGHT: 65 IN | OXYGEN SATURATION: 96 % | WEIGHT: 160 LBS | SYSTOLIC BLOOD PRESSURE: 130 MMHG | DIASTOLIC BLOOD PRESSURE: 74 MMHG | BODY MASS INDEX: 26.66 KG/M2 | HEART RATE: 62 BPM | RESPIRATION RATE: 16 BRPM

## 2023-06-01 DIAGNOSIS — E78.5 HYPERLIPIDEMIA, UNSPECIFIED HYPERLIPIDEMIA TYPE: ICD-10-CM

## 2023-06-01 DIAGNOSIS — Z13.0 SCREENING, ANEMIA, DEFICIENCY, IRON: ICD-10-CM

## 2023-06-01 DIAGNOSIS — Z79.890 HORMONE REPLACEMENT THERAPY (HRT): ICD-10-CM

## 2023-06-01 DIAGNOSIS — E66.3 OVERWEIGHT (BMI 25.0-29.9): ICD-10-CM

## 2023-06-01 DIAGNOSIS — R10.31 RIGHT LOWER QUADRANT ABDOMINAL PAIN: ICD-10-CM

## 2023-06-01 PROCEDURE — 3078F DIAST BP <80 MM HG: CPT | Performed by: FAMILY MEDICINE

## 2023-06-01 PROCEDURE — 99214 OFFICE O/P EST MOD 30 MIN: CPT | Performed by: FAMILY MEDICINE

## 2023-06-01 PROCEDURE — 3075F SYST BP GE 130 - 139MM HG: CPT | Performed by: FAMILY MEDICINE

## 2023-06-01 RX ORDER — ESTRADIOL 0.03 MG/D
FILM, EXTENDED RELEASE TRANSDERMAL
Qty: 8 PATCH | Refills: 0 | Status: SHIPPED | OUTPATIENT
Start: 2023-06-01 | End: 2023-07-17

## 2023-06-01 ASSESSMENT — FIBROSIS 4 INDEX: FIB4 SCORE: 1.2

## 2023-06-01 NOTE — TELEPHONE ENCOUNTER
Received request via: Pharmacy    Was the patient seen in the last year in this department? Yes  LOV 11/08/2022  Does the patient have an active prescription (recently filled or refills available) for medication(s) requested? No    Does the patient have retirement Plus and need 100 day supply (blood pressure, diabetes and cholesterol meds only)? Medication is not for cholesterol, blood pressure or diabetes and Patient does not have SCP

## 2023-06-01 NOTE — PROGRESS NOTES
Chief Complaint:   Chief Complaint   Patient presents with    Pain     Right leg pain       HPI: Established patient  Jacqueline Woodard is a 72 y.o. female who presents for follow-up, discussed the following today:    1. Right lower quadrant abdominal pain  New concern  Patient states she is feeling a pain on her right hip region on the anterior side that started few weeks ago, the pain is not persistent it is only when she does certain movement when standing or sitting for long time.  She feels the pain is sharp and sudden and then it resolves spontaneously, she denies pain at this time.  When I did the exam I have noticed that the discomfort where she is describing the pain is in the lower abdominal area without tenderness    2. Hyperlipidemia, unspecified hyperlipidemia type  Chronic ongoing, on atorvastatin 20 mg daily    3. Overweight (BMI 25.0-29.9)  BMI 26.6, would like to check and make sure no diabetes or thyroid issues trying to lose weight    4. Screening, anemia, deficiency, iron  Due to screen for anemia and adding labs        Past medical history, family history, social history and medications reviewed and updated in the record.   Current medications, problem list and allergies reviewed in [facility]  Health maintenance topics are reviewed and updated.    Patient Active Problem List    Diagnosis Date Noted    Encounter to establish care with new doctor 08/05/2022    Hypovitaminosis D 01/31/2019    Health care maintenance 01/31/2019    Essential hypertension 12/14/2017    Mixed hyperlipidemia 01/30/2014    Vitamin D deficiency disease 01/30/2014     Family History   Problem Relation Age of Onset    Cancer Mother         advanced ? Ovarian    Hypertension Mother     Hyperlipidemia Mother     Psychiatric Illness Mother     Diabetes Mother     Obesity Mother     Cancer Father         colon    Seizures Sister     Cancer Sister         ovarian    Cancer Brother         bladder, smoker    Cancer Brother         bladder,  smoker    Cancer Brother         lungs, smoker     Social History     Socioeconomic History    Marital status: Single     Spouse name: Not on file    Number of children: Not on file    Years of education: Not on file    Highest education level: Not on file   Occupational History    Not on file   Tobacco Use    Smoking status: Never    Smokeless tobacco: Never   Vaping Use    Vaping Use: Never used   Substance and Sexual Activity    Alcohol use: Yes     Comment: occassinally    Drug use: No    Sexual activity: Not Currently     Partners: Male   Other Topics Concern    Not on file   Social History Narrative    ** Merged History Encounter **          Social Determinants of Health     Financial Resource Strain: Not on file   Food Insecurity: Not on file   Transportation Needs: Not on file   Physical Activity: Not on file   Stress: Not on file   Social Connections: Not on file   Intimate Partner Violence: Not on file   Housing Stability: Not on file     Current Outpatient Medications   Medication Sig Dispense Refill    Estradiol (MICHAEL) 0.025 MG/24HR PATCH BIWEEKLY APPLY 1 PATCH TOPICALLY TWICE A WEEK 8 Patch 0    omeprazole (PRILOSEC) 20 MG delayed-release capsule Take 1 capsule by mouth once daily 90 Capsule 0    docusate sodium (COLACE) 100 MG Cap Take 1 Capsule by mouth 2 times a day. 60 Capsule 3    atorvastatin (LIPITOR) 20 MG Tab Take 1 Tablet by mouth every evening. 90 Tablet 3    losartan (COZAAR) 100 MG Tab Take 1 Tablet by mouth every day. 90 Tablet 0    albuterol 108 (90 Base) MCG/ACT Aero Soln inhalation aerosol Inhale 2 Puffs every 6 hours as needed for Shortness of Breath. 8.5 g 0    estradiol (ESTRACE) 1 MG Tab Take 1/2 (one-half) tablet by mouth once daily 15 tablet 3    Cholecalciferol 1.25 MG (30353 UT) Tab VITAMIN D TABLET      therapeutic multivitamin-minerals (THERAGRAN-M) TABS Take 1 Tab by mouth every day.       No current facility-administered medications for this visit.           Review Of  "Systems  As documented in HPI above  PHYSICAL EXAMINATION:    /74 (BP Location: Right arm, Patient Position: Sitting, BP Cuff Size: Adult)   Pulse 62   Temp 36.8 °C (98.2 °F) (Temporal)   Resp 16   Ht 1.651 m (5' 5\")   Wt 72.6 kg (160 lb)   LMP  (LMP Unknown)   SpO2 96%   BMI 26.63 kg/m²   Gen.: Well-developed, well-nourished, no apparent distress, pleasant and cooperative with the examination  HEENT: Normocephalic/atraumatic,   Neck: No JVD or bruits, no adenopathy  Cor: Regular rate and rhythm without murmur gallop or rub  Lungs: Clear to auscultation with equal breath sounds bilaterally. No wheezes, rhonchi.  Abdomen: Soft nontender without hepatosplenomegaly or masses appreciated, normoactive bowel sounds  Extremities: No cyanosis, clubbing or edema       ASSESSMENT/Plan:  1. Right lower quadrant abdominal pain  New concern, patient is describing her pain as hip pain, yet on exam I have noticed that the pain with she is having the discomfort is more towards the lower abdominal right side area, discussed with the patient that I will rule out a hip causes by ordering an x-ray also she needs to do a pelvic ultrasound for further evaluation along with labs, follow-up as directed no pain no red flags at this time benign clinical exam, no rebound tenderness  DX-HIP-COMPLETE - UNILATERAL 2+ RIGHT    US-PELVIC COMPLETE (TRANSABDOMINAL/TRANSVAGINAL) (COMBO)    CBC WITH DIFFERENTIAL    Comp Metabolic Panel      2. Hyperlipidemia, unspecified hyperlipidemia type  Chronic continue statins recheck lipid profile and liver function test  Comp Metabolic Panel    Lipid Profile      3. Overweight (BMI 25.0-29.9)  TSH WITH REFLEX TO FT4    HEMOGLOBIN A1C      4. Screening, anemia, deficiency, iron  CBC WITH DIFFERENTIAL         Please note that this dictation was created using voice recognition software. I have worked with consultants from the vendor as well as technical experts from [employer] to optimize the " interface. I have made every reasonable attempt to correct obvious errors, but I expect that there are errors of grammar and possibly content that I did not discover before finalizing the note.

## 2023-06-08 ENCOUNTER — OFFICE VISIT (OUTPATIENT)
Dept: URGENT CARE | Facility: CLINIC | Age: 73
End: 2023-06-08
Payer: COMMERCIAL

## 2023-06-08 VITALS
SYSTOLIC BLOOD PRESSURE: 140 MMHG | BODY MASS INDEX: 26.49 KG/M2 | TEMPERATURE: 98.8 F | HEART RATE: 70 BPM | DIASTOLIC BLOOD PRESSURE: 80 MMHG | WEIGHT: 159 LBS | HEIGHT: 65 IN | RESPIRATION RATE: 18 BRPM | OXYGEN SATURATION: 95 %

## 2023-06-08 DIAGNOSIS — R68.89 FLU-LIKE SYMPTOMS: ICD-10-CM

## 2023-06-08 DIAGNOSIS — U07.1 COVID-19: ICD-10-CM

## 2023-06-08 LAB
FLUAV RNA SPEC QL NAA+PROBE: NEGATIVE
FLUBV RNA SPEC QL NAA+PROBE: NEGATIVE
RSV RNA SPEC QL NAA+PROBE: NEGATIVE
SARS-COV-2 RNA RESP QL NAA+PROBE: POSITIVE

## 2023-06-08 PROCEDURE — 87651 STREP A DNA AMP PROBE: CPT | Performed by: PHYSICIAN ASSISTANT

## 2023-06-08 PROCEDURE — 3077F SYST BP >= 140 MM HG: CPT | Performed by: PHYSICIAN ASSISTANT

## 2023-06-08 PROCEDURE — 0241U POCT CEPHEID COV-2, FLU A/B, RSV - PCR: CPT | Performed by: PHYSICIAN ASSISTANT

## 2023-06-08 PROCEDURE — 3079F DIAST BP 80-89 MM HG: CPT | Performed by: PHYSICIAN ASSISTANT

## 2023-06-08 PROCEDURE — 99214 OFFICE O/P EST MOD 30 MIN: CPT | Performed by: PHYSICIAN ASSISTANT

## 2023-06-08 ASSESSMENT — VISUAL ACUITY: OU: 1

## 2023-06-08 ASSESSMENT — FIBROSIS 4 INDEX: FIB4 SCORE: 1.2

## 2023-06-08 NOTE — PROGRESS NOTES
".  Subjective:   Jacqueline Woodard is a 72 y.o. female who presents for Congestion (X 3 days, congestion, body aches, headache, cough with pain. )     3 day h/o myalgias, chest congestion, sinus prssure, HA.  Mild cough.  Using mucinex.  No home covid testing.  No ST.  Mild SOB. No underlying respiratory illnesses.  Some chills no fever.  No sick contacts.  Vaccinated against covid. Works at [employer].      Medications:  albuterol Aers  atorvastatin Tabs  Cholecalciferol Tabs  docusate sodium Caps  Estradiol Pttw  estradiol Tabs  losartan Tabs  omeprazole  therapeutic multivitamin-minerals Tabs    Allergies:             Clindamycin, Erythromycin [akne-mycin], and Lisinopril    Surgical History:         Past Surgical History:   Procedure Laterality Date    HYSTERECTOMY, TOTAL ABDOMINAL  01/01/1992    ABDOMINAL HYSTERECTOMY TOTAL         Past Social Hx:  Jacqueline Woodadr  reports that she has never smoked. She has never used smokeless tobacco. She reports current alcohol use. She reports that she does not use drugs.     Past Family Hx:   Jacqueline Woodard family history includes Cancer in her brother, brother, brother, father, mother, and sister; Diabetes in her mother; Hyperlipidemia in her mother; Hypertension in her mother; Obesity in her mother; Psychiatric Illness in her mother; Seizures in her sister.       Problem list, medications, and allergies reviewed by myself today in Epic.     Objective:     BP (!) 140/80   Pulse 70   Temp 37.1 °C (98.8 °F) (Temporal)   Resp 18   Ht 1.651 m (5' 5\")   Wt 72.1 kg (159 lb)   LMP  (LMP Unknown)   SpO2 95%   BMI 26.46 kg/m²     Physical Exam  Vitals and nursing note reviewed.   Constitutional:       General: She is not in acute distress.     Appearance: She is well-developed. She is ill-appearing. She is not toxic-appearing or diaphoretic.   HENT:      Head: Normocephalic. No right periorbital erythema or left periorbital erythema.      Right Ear: Ear canal and external ear normal. No " mastoid tenderness. Tympanic membrane is injected. Tympanic membrane is not perforated or bulging.      Left Ear: Ear canal and external ear normal. No mastoid tenderness. Tympanic membrane is injected. Tympanic membrane is not perforated or bulging.      Nose: Mucosal edema and rhinorrhea present.      Mouth/Throat:      Mouth: Mucous membranes are dry.      Pharynx: Uvula midline. Posterior oropharyngeal erythema present. No uvula swelling.   Eyes:      General: Vision grossly intact. No allergic shiner.     Conjunctiva/sclera: Conjunctivae normal.      Pupils: Pupils are equal, round, and reactive to light.   Cardiovascular:      Rate and Rhythm: Normal rate and regular rhythm.      Pulses: Normal pulses.      Heart sounds: Normal heart sounds. No murmur heard.  Pulmonary:      Effort: Pulmonary effort is normal. No tachypnea, accessory muscle usage, prolonged expiration or respiratory distress.      Breath sounds: Normal breath sounds and air entry. No decreased air movement. No decreased breath sounds, wheezing, rhonchi or rales.      Comments: Lungs clear to auscultation bilaterally, no rhonchi rales or wheezes  Musculoskeletal:         General: Normal range of motion.      Cervical back: Normal range of motion and neck supple. No rigidity.   Lymphadenopathy:      Cervical: No cervical adenopathy.   Skin:     General: Skin is warm and dry.   Neurological:      Mental Status: She is alert and oriented to person, place, and time.   Psychiatric:         Behavior: Behavior is cooperative.       Results for orders placed or performed in visit on 06/08/23   POCT CoV-2, Flu A/B, RSV by PCR   Result Value Ref Range    SARS-CoV-2 by PCR Positive (A) Negative, Invalid    Influenza virus A RNA Negative Negative, Invalid    Influenza virus B, PCR Negative Negative, Invalid    RSV, PCR Negative Negative, Invalid         Assessment/Plan:     Diagnosis and Associated Orders:     1. Flu-like symptoms  - POCT CoV-2, Flu A/B,  RSV by PCR    2. COVID-19  - Nirmatrelvir&Ritonavir 300/100 20 x 150 MG & 10 x 100MG Tablet Therapy Pack; Take 300 mg nirmatrelvir (two 150 mg tablets) with 100 mg ritonavir (one 100 mg tablet) by mouth, with all three tablets taken together twice daily for 5 days.  Dispense: 30 Each; Refill: 0        Comments/MDM:  Patient's vital signs are reassuring and they appear hemodynamically stable and do not require higher level care at this time.  They are not hypoxic and have a normal pulmonary exam.  I discussed self isolation and ER precautions.  Patient should to proceed to ED for development of symptoms including but not limited to shortness of breath breath, respiratory distress, or worsening symptoms not manageable at home.  I instructed the patient to try to follow up with their PCP (if applicable) for follow up care  If requested, I provided the patient with a work note to provide to their employer or school regarding returning to work and discontinuation of self isolation.  Symptomatic and supportive care:   Plenty of oral hydration and rest   Over the counter cough suppressant as directed.  Tylenol or ibuprofen for pain and fever as directed.   Warm salt water gargles for sore throat, soft foods, cool liquids.   Saline nasal spray and Flonase as a decongestant.   Infection control measures at home. Stay away from people, Hand washing, covering sneeze/cough, disinfect surfaces.   Remain home from work, school, and other populated environments.   All questions were answered and patient demonstrated verbal understanding of above.  Positive for Covid 19. Pt has good understanding of etiology and disease course. Quarantine [place] CDC guidelines- guidelines reviewed.    Pt qualifies for treatment with Paxlovid. Paxlovid risks, benefits, side effects reviewed. Medications reviewed for potential interactions. Renal dose adjustment not indicated.    Discussed use Paxlovid antiviral treatment's potential to reduce risk of  hospitalization and mortality. Side effects are diarrhea, hypertension, and muscle aches, altered taste discussed. Patient agreeable to this. Advised patient to continue with current regimen OTC antihistamine oral medication, Flonase nasal spray, Nasal flushes, and chloraseptic spray.   Recommend supportive measures including humidifier, warm salt water gargles, over-the-counter Cepacol throat lozenges, rest  and increased fluids. Advised to continue with supportive measures. Recommend to take Mucinex as needed during the day and use throat lozenges such as Ricola.  Use dextromethorphan for cough only at night to allow the body to expel the pathogen during the day.  Take 5-10 deep breaths intermittently throughout the day and move the body as tolerated to aid in respiration.     Pt was encouraged to seek treatment in the ER or urgent care for worsening symptoms, fever greater than 100.5, wheezes, shortness of breath, dyspnea, or syncope.Recommend reevaluation with any new or worsening symptoms.       I personally reviewed prior external notes and test results pertinent to today's visit. Supportive care, natural history, differential diagnoses, and indications for immediate follow-up discussed. Return to clinic or go to ED if symptoms worsen or persist.  Red flag symptoms discussed.  Patient/Parent/Guardian voices understanding. Follow-up with your primary care provider in 3-5 days.  All side effects of medication discussed including allergic response, GI upset, tendon injury, rash, sedation etc    Please note that this dictation was created using voice recognition software. I have made a reasonable attempt to correct obvious errors, but I expect that there are errors of grammar and possibly content that I did not discover before finalizing the note.    This note was electronically signed by Lilian Charles PA-C

## 2023-07-05 ENCOUNTER — HOSPITAL ENCOUNTER (OUTPATIENT)
Dept: RADIOLOGY | Facility: MEDICAL CENTER | Age: 73
End: 2023-07-05
Attending: FAMILY MEDICINE
Payer: COMMERCIAL

## 2023-07-05 DIAGNOSIS — R10.31 RIGHT LOWER QUADRANT ABDOMINAL PAIN: ICD-10-CM

## 2023-07-05 PROCEDURE — 76830 TRANSVAGINAL US NON-OB: CPT

## 2023-07-05 PROCEDURE — 73502 X-RAY EXAM HIP UNI 2-3 VIEWS: CPT | Mod: RT

## 2023-07-14 DIAGNOSIS — I10 ESSENTIAL HYPERTENSION: ICD-10-CM

## 2023-07-14 DIAGNOSIS — Z79.890 HORMONE REPLACEMENT THERAPY (HRT): ICD-10-CM

## 2023-07-17 RX ORDER — OMEPRAZOLE 20 MG/1
CAPSULE, DELAYED RELEASE ORAL
Qty: 90 CAPSULE | Refills: 0 | Status: SHIPPED | OUTPATIENT
Start: 2023-07-17 | End: 2023-12-07

## 2023-07-17 RX ORDER — ESTRADIOL 0.03 MG/D
FILM, EXTENDED RELEASE TRANSDERMAL
Qty: 8 PATCH | Refills: 0 | Status: SHIPPED | OUTPATIENT
Start: 2023-07-17 | End: 2023-08-25

## 2023-07-17 RX ORDER — LOSARTAN POTASSIUM 100 MG/1
TABLET ORAL
Qty: 90 TABLET | Refills: 0 | Status: SHIPPED | OUTPATIENT
Start: 2023-07-17 | End: 2023-12-07

## 2023-08-24 DIAGNOSIS — Z79.890 HORMONE REPLACEMENT THERAPY (HRT): ICD-10-CM

## 2023-08-25 RX ORDER — ESTRADIOL 0.03 MG/D
FILM, EXTENDED RELEASE TRANSDERMAL
Qty: 8 PATCH | Refills: 0 | Status: SHIPPED | OUTPATIENT
Start: 2023-08-25 | End: 2023-11-17

## 2023-08-25 NOTE — TELEPHONE ENCOUNTER
Received request via: Pharmacy    Was the patient seen in the last year in this department? Yes  LOV: 6/1/2023  Does the patient have an active prescription (recently filled or refills available) for medication(s) requested? No    Does the patient have jail Plus and need 100 day supply (blood pressure, diabetes and cholesterol meds only)? Patient does not have SCP

## 2023-09-06 ENCOUNTER — HOSPITAL ENCOUNTER (OUTPATIENT)
Dept: LAB | Facility: MEDICAL CENTER | Age: 73
End: 2023-09-06
Attending: FAMILY MEDICINE
Payer: COMMERCIAL

## 2023-09-06 DIAGNOSIS — E78.5 HYPERLIPIDEMIA, UNSPECIFIED HYPERLIPIDEMIA TYPE: ICD-10-CM

## 2023-09-06 DIAGNOSIS — R10.31 RIGHT LOWER QUADRANT ABDOMINAL PAIN: ICD-10-CM

## 2023-09-06 DIAGNOSIS — Z13.0 SCREENING, ANEMIA, DEFICIENCY, IRON: ICD-10-CM

## 2023-09-06 DIAGNOSIS — E66.3 OVERWEIGHT (BMI 25.0-29.9): ICD-10-CM

## 2023-09-06 LAB
ALBUMIN SERPL BCP-MCNC: 4.3 G/DL (ref 3.2–4.9)
ALBUMIN/GLOB SERPL: 1.7 G/DL
ALP SERPL-CCNC: 71 U/L (ref 30–99)
ALT SERPL-CCNC: 22 U/L (ref 2–50)
ANION GAP SERPL CALC-SCNC: 11 MMOL/L (ref 7–16)
AST SERPL-CCNC: 23 U/L (ref 12–45)
BASOPHILS # BLD AUTO: 0.2 % (ref 0–1.8)
BASOPHILS # BLD: 0.01 K/UL (ref 0–0.12)
BILIRUB SERPL-MCNC: 0.7 MG/DL (ref 0.1–1.5)
BUN SERPL-MCNC: 10 MG/DL (ref 8–22)
CALCIUM ALBUM COR SERPL-MCNC: 8.7 MG/DL (ref 8.5–10.5)
CALCIUM SERPL-MCNC: 8.9 MG/DL (ref 8.4–10.2)
CHLORIDE SERPL-SCNC: 108 MMOL/L (ref 96–112)
CHOLEST SERPL-MCNC: 183 MG/DL (ref 100–199)
CO2 SERPL-SCNC: 23 MMOL/L (ref 20–33)
CREAT SERPL-MCNC: 0.66 MG/DL (ref 0.5–1.4)
EOSINOPHIL # BLD AUTO: 0.09 K/UL (ref 0–0.51)
EOSINOPHIL NFR BLD: 2.2 % (ref 0–6.9)
ERYTHROCYTE [DISTWIDTH] IN BLOOD BY AUTOMATED COUNT: 44.7 FL (ref 35.9–50)
EST. AVERAGE GLUCOSE BLD GHB EST-MCNC: 117 MG/DL
FASTING STATUS PATIENT QL REPORTED: NORMAL
GFR SERPLBLD CREATININE-BSD FMLA CKD-EPI: 92 ML/MIN/1.73 M 2
GLOBULIN SER CALC-MCNC: 2.6 G/DL (ref 1.9–3.5)
GLUCOSE SERPL-MCNC: 96 MG/DL (ref 65–99)
HBA1C MFR BLD: 5.7 % (ref 4–5.6)
HCT VFR BLD AUTO: 47.1 % (ref 37–47)
HDLC SERPL-MCNC: 55 MG/DL
HGB BLD-MCNC: 15.5 G/DL (ref 12–16)
IMM GRANULOCYTES # BLD AUTO: 0.01 K/UL (ref 0–0.11)
IMM GRANULOCYTES NFR BLD AUTO: 0.2 % (ref 0–0.9)
LDLC SERPL CALC-MCNC: 109 MG/DL
LYMPHOCYTES # BLD AUTO: 2.11 K/UL (ref 1–4.8)
LYMPHOCYTES NFR BLD: 52.2 % (ref 22–41)
MCH RBC QN AUTO: 31 PG (ref 27–33)
MCHC RBC AUTO-ENTMCNC: 32.9 G/DL (ref 32.2–35.5)
MCV RBC AUTO: 94.2 FL (ref 81.4–97.8)
MONOCYTES # BLD AUTO: 0.25 K/UL (ref 0–0.85)
MONOCYTES NFR BLD AUTO: 6.2 % (ref 0–13.4)
NEUTROPHILS # BLD AUTO: 1.57 K/UL (ref 1.82–7.42)
NEUTROPHILS NFR BLD: 39 % (ref 44–72)
NRBC # BLD AUTO: 0 K/UL
NRBC BLD-RTO: 0 /100 WBC (ref 0–0.2)
PLATELET # BLD AUTO: 235 K/UL (ref 164–446)
PMV BLD AUTO: 10.6 FL (ref 9–12.9)
POTASSIUM SERPL-SCNC: 4.2 MMOL/L (ref 3.6–5.5)
PROT SERPL-MCNC: 6.9 G/DL (ref 6–8.2)
RBC # BLD AUTO: 5 M/UL (ref 4.2–5.4)
SODIUM SERPL-SCNC: 142 MMOL/L (ref 135–145)
TRIGL SERPL-MCNC: 95 MG/DL (ref 0–149)
TSH SERPL DL<=0.005 MIU/L-ACNC: 0.57 UIU/ML (ref 0.38–5.33)
WBC # BLD AUTO: 4 K/UL (ref 4.8–10.8)

## 2023-09-06 PROCEDURE — 36415 COLL VENOUS BLD VENIPUNCTURE: CPT

## 2023-09-06 PROCEDURE — 85025 COMPLETE CBC W/AUTO DIFF WBC: CPT

## 2023-09-06 PROCEDURE — 80061 LIPID PANEL: CPT

## 2023-09-06 PROCEDURE — 80053 COMPREHEN METABOLIC PANEL: CPT

## 2023-09-06 PROCEDURE — 84443 ASSAY THYROID STIM HORMONE: CPT

## 2023-09-06 PROCEDURE — 83036 HEMOGLOBIN GLYCOSYLATED A1C: CPT

## 2023-10-12 ENCOUNTER — APPOINTMENT (OUTPATIENT)
Dept: RADIOLOGY | Facility: MEDICAL CENTER | Age: 73
End: 2023-10-12
Attending: FAMILY MEDICINE
Payer: COMMERCIAL

## 2023-10-12 DIAGNOSIS — Z12.31 VISIT FOR SCREENING MAMMOGRAM: ICD-10-CM

## 2023-11-14 DIAGNOSIS — Z79.890 HORMONE REPLACEMENT THERAPY (HRT): ICD-10-CM

## 2023-11-14 NOTE — TELEPHONE ENCOUNTER
Received request via: Pharmacy    Was the patient seen in the last year in this department? Yes  6/1/23  Does the patient have an active prescription (recently filled or refills available) for medication(s) requested? No    Does the patient have snf Plus and need 100 day supply (blood pressure, diabetes and cholesterol meds only)? Medication is not for cholesterol, blood pressure or diabetes

## 2023-11-17 RX ORDER — ESTRADIOL 0.03 MG/D
FILM, EXTENDED RELEASE TRANSDERMAL
Qty: 8 PATCH | Refills: 0 | Status: SHIPPED | OUTPATIENT
Start: 2023-11-17 | End: 2024-01-16

## 2023-12-06 DIAGNOSIS — E78.5 HYPERLIPIDEMIA, UNSPECIFIED HYPERLIPIDEMIA TYPE: ICD-10-CM

## 2023-12-06 DIAGNOSIS — I10 ESSENTIAL HYPERTENSION: ICD-10-CM

## 2023-12-07 RX ORDER — LOSARTAN POTASSIUM 100 MG/1
TABLET ORAL
Qty: 90 TABLET | Refills: 0 | Status: SHIPPED | OUTPATIENT
Start: 2023-12-07

## 2023-12-07 RX ORDER — ATORVASTATIN CALCIUM 20 MG/1
20 TABLET, FILM COATED ORAL EVERY EVENING
Qty: 90 TABLET | Refills: 0 | Status: SHIPPED | OUTPATIENT
Start: 2023-12-07

## 2023-12-07 RX ORDER — OMEPRAZOLE 20 MG/1
CAPSULE, DELAYED RELEASE ORAL
Qty: 90 CAPSULE | Refills: 0 | Status: SHIPPED | OUTPATIENT
Start: 2023-12-07

## 2024-01-16 ENCOUNTER — APPOINTMENT (OUTPATIENT)
Dept: RADIOLOGY | Facility: MEDICAL CENTER | Age: 74
End: 2024-01-16
Attending: FAMILY MEDICINE
Payer: COMMERCIAL

## 2024-01-16 DIAGNOSIS — Z79.890 HORMONE REPLACEMENT THERAPY (HRT): ICD-10-CM

## 2024-01-16 RX ORDER — ESTRADIOL 0.03 MG/D
FILM, EXTENDED RELEASE TRANSDERMAL
Qty: 8 PATCH | Refills: 0 | Status: SHIPPED | OUTPATIENT
Start: 2024-01-16 | End: 2024-03-07

## 2024-02-15 ENCOUNTER — OFFICE VISIT (OUTPATIENT)
Dept: MEDICAL GROUP | Facility: LAB | Age: 74
End: 2024-02-15
Payer: COMMERCIAL

## 2024-02-15 VITALS
OXYGEN SATURATION: 96 % | RESPIRATION RATE: 14 BRPM | DIASTOLIC BLOOD PRESSURE: 76 MMHG | HEIGHT: 65 IN | HEART RATE: 62 BPM | TEMPERATURE: 98.4 F | WEIGHT: 156 LBS | BODY MASS INDEX: 25.99 KG/M2 | SYSTOLIC BLOOD PRESSURE: 120 MMHG

## 2024-02-15 DIAGNOSIS — Z78.0 ASYMPTOMATIC POSTMENOPAUSAL STATE: ICD-10-CM

## 2024-02-15 DIAGNOSIS — Z79.890 HORMONE REPLACEMENT THERAPY (POSTMENOPAUSAL): ICD-10-CM

## 2024-02-15 DIAGNOSIS — K21.9 GASTROESOPHAGEAL REFLUX DISEASE, UNSPECIFIED WHETHER ESOPHAGITIS PRESENT: ICD-10-CM

## 2024-02-15 DIAGNOSIS — R07.9 CHEST PAIN, UNSPECIFIED TYPE: ICD-10-CM

## 2024-02-15 DIAGNOSIS — R13.10 DYSPHAGIA, UNSPECIFIED TYPE: ICD-10-CM

## 2024-02-15 DIAGNOSIS — Z23 NEED FOR VACCINATION: ICD-10-CM

## 2024-02-15 DIAGNOSIS — L98.9 SKIN LESION: ICD-10-CM

## 2024-02-15 PROCEDURE — 3074F SYST BP LT 130 MM HG: CPT | Performed by: FAMILY MEDICINE

## 2024-02-15 PROCEDURE — 99214 OFFICE O/P EST MOD 30 MIN: CPT | Mod: 25 | Performed by: FAMILY MEDICINE

## 2024-02-15 PROCEDURE — 3078F DIAST BP <80 MM HG: CPT | Performed by: FAMILY MEDICINE

## 2024-02-15 PROCEDURE — 90677 PCV20 VACCINE IM: CPT | Performed by: FAMILY MEDICINE

## 2024-02-15 PROCEDURE — 90471 IMMUNIZATION ADMIN: CPT | Performed by: FAMILY MEDICINE

## 2024-02-15 ASSESSMENT — FIBROSIS 4 INDEX: FIB4 SCORE: 1.52

## 2024-02-15 NOTE — PROGRESS NOTES
Chief Complaint:   Chief Complaint   Patient presents with    Follow-Up     referrals       HPI:Established patient   Jacqueline Woodard is a 73 y.o. female who presents for follow-up, discussed the following today:    1. Chest pain, unspecified type  New concern to me, ongoing for the patient for more than 6 months now, she reports having vague symptoms of sometimes chest pain, sometimes pain on the back area between the scapula, and GERD symptoms.  Would like to check and make sure it is not related to her heart.  Denies chest pain today.  Reports sometimes the pain is on the left side of the chest radiating to her left upper extremity.    2. Gastroesophageal reflux disease, unspecified whether esophagitis present  Reports GERD symptoms and epigastric discomfort sometimes, patient is on omeprazole on regular use.  Denies red flag symptoms like vomiting blood or blood in stool or other concerns.    3. Dysphagia, unspecified type  Patient reports sometimes solids like meat stuck on her throat.  And difficulty of swallowing.  Denies unintentional weight loss or other concerns.    4. Skin lesion  New concern of a lesion on the area above her upper lip on the left side.  Would like to be checked by a dermatologist to rule out skin cancer.  Patient denies specific lesion she just feels the area is a little bit rough and this is a new change for her.    5. Hormone replacement therapy (postmenopausal)  Patient has been on hormone replacement therapy for a long time, she started tapering down but she notices increase of her blood pressure whenever she tapers down, patient would like to continue, aware of possible complications especially above the age of 65 she has been on it for a long time.  Discussed with the patient referral to follow-up with gynecology for further discussion.    6. Asymptomatic postmenopausal state    Patient is due for DEXA bone scan  7. Need for vaccination  Agreed for pneumonia vaccine today.  Denies  acute illness or fever          Past medical history, family history, social history and medications reviewed and updated in the record. Today   Current medications, problem list and allergies reviewed in EPIC today   Health maintenance topics are reviewed and updated.    Patient Active Problem List    Diagnosis Date Noted    Encounter to establish care with new doctor 08/05/2022    Hypovitaminosis D 01/31/2019    Health care maintenance 01/31/2019    Essential hypertension 12/14/2017    Mixed hyperlipidemia 01/30/2014    Vitamin D deficiency disease 01/30/2014     Family History   Problem Relation Age of Onset    Cancer Mother         advanced ? Ovarian    Hypertension Mother     Hyperlipidemia Mother     Psychiatric Illness Mother     Diabetes Mother     Obesity Mother     Cancer Father         colon    Seizures Sister     Cancer Sister         ovarian    Cancer Brother         bladder, smoker    Cancer Brother         bladder, smoker    Cancer Brother         lungs, smoker     Social History     Socioeconomic History    Marital status: Single     Spouse name: Not on file    Number of children: Not on file    Years of education: Not on file    Highest education level: Not on file   Occupational History    Not on file   Tobacco Use    Smoking status: Never    Smokeless tobacco: Never   Vaping Use    Vaping Use: Never used   Substance and Sexual Activity    Alcohol use: Yes     Comment: occassinally    Drug use: No    Sexual activity: Not Currently     Partners: Male   Other Topics Concern    Not on file   Social History Narrative    ** Merged History Encounter **          Social Determinants of Health     Financial Resource Strain: Not on file   Food Insecurity: Not on file   Transportation Needs: Not on file   Physical Activity: Not on file   Stress: Not on file   Social Connections: Not on file   Intimate Partner Violence: Not on file   Housing Stability: Not on file       Current Outpatient Medications  "  Medication Sig Dispense Refill    Estradiol (MICHAEL) 0.025 MG/24HR PATCH BIWEEKLY APPLY 1 PATCH TOPICALLY TWICE A WEEK 8 Patch 0    losartan (COZAAR) 100 MG Tab Take 1 tablet by mouth once daily 90 Tablet 0    omeprazole (PRILOSEC) 20 MG delayed-release capsule Take 1 capsule by mouth once daily 90 Capsule 0    atorvastatin (LIPITOR) 20 MG Tab TAKE 1 TABLET BY MOUTH ONCE DAILY IN THE EVENING 90 Tablet 0    docusate sodium (COLACE) 100 MG Cap Take 1 Capsule by mouth 2 times a day. 60 Capsule 3    albuterol 108 (90 Base) MCG/ACT Aero Soln inhalation aerosol Inhale 2 Puffs every 6 hours as needed for Shortness of Breath. 8.5 g 0    estradiol (ESTRACE) 1 MG Tab Take 1/2 (one-half) tablet by mouth once daily 15 tablet 3    Cholecalciferol 1.25 MG (46368 UT) Tab VITAMIN D TABLET      therapeutic multivitamin-minerals (THERAGRAN-M) TABS Take 1 Tab by mouth every day.       No current facility-administered medications for this visit.         Review Of Systems  As documented in HPI above  PHYSICAL EXAMINATION:    /76 (BP Location: Right arm, Patient Position: Sitting, BP Cuff Size: Adult)   Pulse 62   Temp 36.9 °C (98.4 °F)   Resp 14   Ht 1.651 m (5' 5\")   Wt 70.8 kg (156 lb)   LMP  (LMP Unknown)   SpO2 96%   BMI 25.96 kg/m²   Gen.: Well-developed, well-nourished, no apparent distress, pleasant and cooperative with the examination  HEENT: Normocephalic/atraumatic, sinuses nontender with palpation, TMs clear, nares patent with pink mucosa and clear rhinorrhea, oropharynx clear  Neck: No JVD or bruits, no adenopathy  Cor: Regular rate and rhythm without murmur gallop or rub  Lungs: Clear to auscultation with equal breath sounds bilaterally. No wheezes, rhonchi.  Abdomen: Soft nontender without hepatosplenomegaly or masses appreciated, normoactive bowel sounds  Extremities: No cyanosis, clubbing or edema       ASSESSMENT/Plan:  1. Chest pain, unspecified type  New concern, ongoing for the patient for more than 6 " months, no chest pain today.  Discussed with the patient rule out cardiac causes will order stress test, follow-up with cardiology for further evaluation patient to do fresh labs advised if any chest pain in the future patient to report to ER to rule out acute coronary syndrome and she voices understanding, continue on aspirin and statins for now  CBC WITH DIFFERENTIAL    Comp Metabolic Panel    TSH+FREE T4    REFERRAL TO CARDIOLOGY    NM-CARDIAC TREADMILL ONLY-NO IMAGING      2. Gastroesophageal reflux disease, unspecified whether esophagitis present  Chronic continue omeprazole, will consider GI referral if persistent      3. Dysphagia, unspecified type  New concern, rule out esophageal dysmotility barium study test ordered today, continue omeprazole as directed      4. Skin lesion  New lesion, follow-up with dermatology to rule out skin cancer  Referral to Dermatology      5. Hormone replacement therapy (postmenopausal)  Follow-up with gynecology to assess and address hormone replacement therapy, discussed with the patient complications of hardware placement therapy after the age of 65  Referral to Gynecology      6. Asymptomatic postmenopausal state  DS-BONE DENSITY STUDY (DEXA)      7. Need for vaccination  Pneumococcal Conjugate Vaccine 20-Valent (6 wks+)         Please note that this dictation was created using voice recognition software. I have made every reasonable attempt to correct obvious errors but there may be errors of grammar and content that I may have overlooked prior to finalization of this note.

## 2024-02-20 ENCOUNTER — TELEPHONE (OUTPATIENT)
Dept: HEALTH INFORMATION MANAGEMENT | Facility: OTHER | Age: 74
End: 2024-02-20

## 2024-02-21 ENCOUNTER — HOSPITAL ENCOUNTER (OUTPATIENT)
Dept: RADIOLOGY | Facility: MEDICAL CENTER | Age: 74
End: 2024-02-21
Attending: FAMILY MEDICINE
Payer: COMMERCIAL

## 2024-02-21 DIAGNOSIS — Z12.31 VISIT FOR SCREENING MAMMOGRAM: ICD-10-CM

## 2024-02-21 PROCEDURE — 77067 SCR MAMMO BI INCL CAD: CPT

## 2024-03-07 DIAGNOSIS — Z79.890 HORMONE REPLACEMENT THERAPY (HRT): ICD-10-CM

## 2024-03-07 RX ORDER — ESTRADIOL 0.03 MG/D
FILM, EXTENDED RELEASE TRANSDERMAL
Qty: 8 PATCH | Refills: 0 | Status: SHIPPED | OUTPATIENT
Start: 2024-03-07

## 2024-03-07 NOTE — TELEPHONE ENCOUNTER
Received request via: Pharmacy    Was the patient seen in the last year in this department? Yes  2/14/24  Does the patient have an active prescription (recently filled or refills available) for medication(s) requested? No    Pharmacy Name: [facility]    Does the patient have longterm Plus and need 100 day supply (blood pressure, diabetes and cholesterol meds only)? Medication is not for cholesterol, blood pressure or diabetes

## 2024-03-13 ENCOUNTER — HOSPITAL ENCOUNTER (OUTPATIENT)
Dept: LAB | Facility: MEDICAL CENTER | Age: 74
End: 2024-03-13
Attending: FAMILY MEDICINE
Payer: COMMERCIAL

## 2024-03-13 DIAGNOSIS — R07.9 CHEST PAIN, UNSPECIFIED TYPE: ICD-10-CM

## 2024-03-13 LAB
ALBUMIN SERPL BCP-MCNC: 4.5 G/DL (ref 3.2–4.9)
ALBUMIN/GLOB SERPL: 1.5 G/DL
ALP SERPL-CCNC: 73 U/L (ref 30–99)
ALT SERPL-CCNC: 20 U/L (ref 2–50)
ANION GAP SERPL CALC-SCNC: 11 MMOL/L (ref 7–16)
AST SERPL-CCNC: 23 U/L (ref 12–45)
BASOPHILS # BLD AUTO: 0.2 % (ref 0–1.8)
BASOPHILS # BLD: 0.01 K/UL (ref 0–0.12)
BILIRUB SERPL-MCNC: 0.6 MG/DL (ref 0.1–1.5)
BUN SERPL-MCNC: 9 MG/DL (ref 8–22)
CALCIUM ALBUM COR SERPL-MCNC: 9 MG/DL (ref 8.5–10.5)
CALCIUM SERPL-MCNC: 9.4 MG/DL (ref 8.4–10.2)
CHLORIDE SERPL-SCNC: 102 MMOL/L (ref 96–112)
CO2 SERPL-SCNC: 26 MMOL/L (ref 20–33)
CREAT SERPL-MCNC: 0.68 MG/DL (ref 0.5–1.4)
EOSINOPHIL # BLD AUTO: 0.09 K/UL (ref 0–0.51)
EOSINOPHIL NFR BLD: 2 % (ref 0–6.9)
ERYTHROCYTE [DISTWIDTH] IN BLOOD BY AUTOMATED COUNT: 44.3 FL (ref 35.9–50)
FASTING STATUS PATIENT QL REPORTED: NORMAL
GFR SERPLBLD CREATININE-BSD FMLA CKD-EPI: 92 ML/MIN/1.73 M 2
GLOBULIN SER CALC-MCNC: 3 G/DL (ref 1.9–3.5)
GLUCOSE SERPL-MCNC: 83 MG/DL (ref 65–99)
HCT VFR BLD AUTO: 46.2 % (ref 37–47)
HGB BLD-MCNC: 15.6 G/DL (ref 12–16)
IMM GRANULOCYTES # BLD AUTO: 0.01 K/UL (ref 0–0.11)
IMM GRANULOCYTES NFR BLD AUTO: 0.2 % (ref 0–0.9)
LYMPHOCYTES # BLD AUTO: 2.05 K/UL (ref 1–4.8)
LYMPHOCYTES NFR BLD: 46.1 % (ref 22–41)
MCH RBC QN AUTO: 31.1 PG (ref 27–33)
MCHC RBC AUTO-ENTMCNC: 33.8 G/DL (ref 32.2–35.5)
MCV RBC AUTO: 92 FL (ref 81.4–97.8)
MONOCYTES # BLD AUTO: 0.32 K/UL (ref 0–0.85)
MONOCYTES NFR BLD AUTO: 7.2 % (ref 0–13.4)
NEUTROPHILS # BLD AUTO: 1.97 K/UL (ref 1.82–7.42)
NEUTROPHILS NFR BLD: 44.3 % (ref 44–72)
NRBC # BLD AUTO: 0 K/UL
NRBC BLD-RTO: 0 /100 WBC (ref 0–0.2)
PLATELET # BLD AUTO: 227 K/UL (ref 164–446)
PMV BLD AUTO: 10.5 FL (ref 9–12.9)
POTASSIUM SERPL-SCNC: 4.1 MMOL/L (ref 3.6–5.5)
PROT SERPL-MCNC: 7.5 G/DL (ref 6–8.2)
RBC # BLD AUTO: 5.02 M/UL (ref 4.2–5.4)
SODIUM SERPL-SCNC: 139 MMOL/L (ref 135–145)
T4 FREE SERPL-MCNC: 1.11 NG/DL (ref 0.93–1.7)
TSH SERPL DL<=0.005 MIU/L-ACNC: 1.31 UIU/ML (ref 0.38–5.33)
WBC # BLD AUTO: 4.5 K/UL (ref 4.8–10.8)

## 2024-03-13 PROCEDURE — 85025 COMPLETE CBC W/AUTO DIFF WBC: CPT

## 2024-03-13 PROCEDURE — 80053 COMPREHEN METABOLIC PANEL: CPT

## 2024-03-13 PROCEDURE — 36415 COLL VENOUS BLD VENIPUNCTURE: CPT

## 2024-03-13 PROCEDURE — 84439 ASSAY OF FREE THYROXINE: CPT

## 2024-03-13 PROCEDURE — 84443 ASSAY THYROID STIM HORMONE: CPT

## 2024-03-19 ENCOUNTER — HOSPITAL ENCOUNTER (OUTPATIENT)
Dept: RADIOLOGY | Facility: MEDICAL CENTER | Age: 74
End: 2024-03-19
Attending: FAMILY MEDICINE
Payer: COMMERCIAL

## 2024-03-19 DIAGNOSIS — R13.10 DYSPHAGIA, UNSPECIFIED TYPE: ICD-10-CM

## 2024-04-09 DIAGNOSIS — Z79.890 HORMONE REPLACEMENT THERAPY (HRT): ICD-10-CM

## 2024-04-09 RX ORDER — ESTRADIOL 0.03 MG/D
FILM, EXTENDED RELEASE TRANSDERMAL
Qty: 8 PATCH | Refills: 1 | Status: SHIPPED | OUTPATIENT
Start: 2024-04-09

## 2024-04-09 NOTE — TELEPHONE ENCOUNTER
PCP OOO    Received request via: Pharmacy    Was the patient seen in the last year in this department? Yes 2/15/2024    Does the patient have an active prescription (recently filled or refills available) for medication(s) requested? No    [facility]    Does the patient have longterm Plus and need 100 day supply (blood pressure, diabetes and cholesterol meds only)? Patient does not have SCP

## 2024-04-23 ENCOUNTER — HOSPITAL ENCOUNTER (OUTPATIENT)
Dept: RADIOLOGY | Facility: MEDICAL CENTER | Age: 74
End: 2024-04-23
Attending: FAMILY MEDICINE
Payer: COMMERCIAL

## 2024-04-23 DIAGNOSIS — R07.9 CHEST PAIN, UNSPECIFIED TYPE: ICD-10-CM

## 2024-04-23 PROCEDURE — 93017 CV STRESS TEST TRACING ONLY: CPT

## 2024-04-23 PROCEDURE — 93018 CV STRESS TEST I&R ONLY: CPT | Performed by: INTERNAL MEDICINE

## 2024-04-24 ENCOUNTER — HOSPITAL ENCOUNTER (OUTPATIENT)
Dept: RADIOLOGY | Facility: MEDICAL CENTER | Age: 74
End: 2024-04-24
Attending: FAMILY MEDICINE
Payer: COMMERCIAL

## 2024-04-24 DIAGNOSIS — R13.10 DYSPHAGIA, UNSPECIFIED TYPE: ICD-10-CM

## 2024-04-24 PROCEDURE — A9270 NON-COVERED ITEM OR SERVICE: HCPCS | Performed by: FAMILY MEDICINE

## 2024-04-24 PROCEDURE — 700117 HCHG RX CONTRAST REV CODE 255: Performed by: FAMILY MEDICINE

## 2024-04-24 PROCEDURE — 700112 HCHG RX REV CODE 229: Performed by: FAMILY MEDICINE

## 2024-04-24 PROCEDURE — 74221 X-RAY XM ESOPHAGUS 2CNTRST: CPT

## 2024-04-24 RX ADMIN — ANTACID/ANTIFLATULENT 1 PACKET: 380; 550; 10; 10 GRANULE, EFFERVESCENT ORAL at 15:20

## 2024-04-24 RX ADMIN — BARIUM SULFATE 700 MG: 700 TABLET ORAL at 15:15

## 2024-06-07 ENCOUNTER — OFFICE VISIT (OUTPATIENT)
Dept: CARDIOLOGY | Facility: MEDICAL CENTER | Age: 74
End: 2024-06-07
Attending: PHYSICIAN ASSISTANT
Payer: COMMERCIAL

## 2024-06-07 VITALS
RESPIRATION RATE: 16 BRPM | HEIGHT: 65 IN | BODY MASS INDEX: 25.66 KG/M2 | WEIGHT: 154 LBS | DIASTOLIC BLOOD PRESSURE: 84 MMHG | SYSTOLIC BLOOD PRESSURE: 164 MMHG | HEART RATE: 74 BPM | OXYGEN SATURATION: 97 %

## 2024-06-07 DIAGNOSIS — E78.2 MIXED HYPERLIPIDEMIA: ICD-10-CM

## 2024-06-07 DIAGNOSIS — I10 ESSENTIAL HYPERTENSION: ICD-10-CM

## 2024-06-07 DIAGNOSIS — I25.10 CORONARY ARTERY DISEASE DUE TO CALCIFIED CORONARY LESION: ICD-10-CM

## 2024-06-07 DIAGNOSIS — I25.84 CORONARY ARTERY DISEASE DUE TO CALCIFIED CORONARY LESION: ICD-10-CM

## 2024-06-07 PROCEDURE — 99212 OFFICE O/P EST SF 10 MIN: CPT | Performed by: PHYSICIAN ASSISTANT

## 2024-06-07 PROCEDURE — 3077F SYST BP >= 140 MM HG: CPT | Performed by: PHYSICIAN ASSISTANT

## 2024-06-07 PROCEDURE — 99214 OFFICE O/P EST MOD 30 MIN: CPT | Performed by: PHYSICIAN ASSISTANT

## 2024-06-07 PROCEDURE — 3079F DIAST BP 80-89 MM HG: CPT | Performed by: PHYSICIAN ASSISTANT

## 2024-06-07 RX ORDER — AMLODIPINE BESYLATE 5 MG/1
5 TABLET ORAL DAILY
Qty: 90 TABLET | Refills: 3 | Status: SHIPPED | OUTPATIENT
Start: 2024-06-07

## 2024-06-07 RX ORDER — OLMESARTAN MEDOXOMIL 40 MG/1
40 TABLET ORAL DAILY
Qty: 90 TABLET | Refills: 3 | Status: SHIPPED | OUTPATIENT
Start: 2024-06-07

## 2024-06-07 RX ORDER — LOTILANER OPHTHALMIC SOLUTION 2.5 MG/ML
SOLUTION/ DROPS OPHTHALMIC
COMMUNITY
Start: 2024-06-05 | End: 2024-06-07

## 2024-06-07 RX ORDER — HYDROCHLOROTHIAZIDE 12.5 MG/1
12.5 CAPSULE, GELATIN COATED ORAL
Qty: 90 CAPSULE | Refills: 1 | Status: SHIPPED | OUTPATIENT
Start: 2024-06-07

## 2024-06-07 ASSESSMENT — ENCOUNTER SYMPTOMS
PALPITATIONS: 0
HEADACHES: 1
SHORTNESS OF BREATH: 0
PND: 0

## 2024-06-07 ASSESSMENT — FIBROSIS 4 INDEX: FIB4 SCORE: 1.65

## 2024-06-07 NOTE — PROGRESS NOTES
Chief Complaint   Patient presents with    Hypertension     Follow up           Subjective:   Jacqueline Woodard is a 73 y.o. female who presents today for follow-up.     Last seen in Feb 2023 by Dr. Aceves for dizziness. Work up included a 30d monitor .  Current medical problems include hypertension, hyperlipidemia with coronary artery calcification.     She completed an exercise stress test with EKG and no imaging, she completed 5 minutes on the Sen protocol 7.1 METS with no ischemia on EKG, occasional PACs.    Jacqueline is here for uncontrolled blood pressures. She takes losartan but over the last few months the numbers have been high. Reports a lot of stress with work right now. The chest pressure can occur with high blood pressure. Feels lethargic, dizzy.     She has been seen on 4 occasions by [facility] in 2017 by Haydee Bennett MD, 2019 by Bernardo GOODMAN and 2020 by Demario Call MD, in 2023 by Dr. Aceves.     Past Medical History:   Diagnosis Date    Arthritis     Backpain     Herniated disks    Bleeding from the nose     Bronchitis     Chronic pain of right hip     Gastroesophageal reflux disease without esophagitis     Heart burn     Hemorrhoids     Hyperlipidemia     Hypertension     Measles     Mumps     Pneumonia     TIA (transient ischemic attack) 2012    Urinary tract infection          Family History   Problem Relation Age of Onset    Cancer Mother         advanced ? Ovarian    Hypertension Mother     Hyperlipidemia Mother     Psychiatric Illness Mother     Diabetes Mother     Obesity Mother     Cancer Father         colon    Seizures Sister     Cancer Sister         ovarian    Cancer Brother         bladder, smoker    Cancer Brother         bladder, smoker    Cancer Brother         lungs, smoker         Social History     Tobacco Use    Smoking status: Never    Smokeless tobacco: Never   Vaping Use    Vaping status: Never Used   Substance Use Topics    Alcohol use: Yes     Comment:  "occassinally    Drug use: No         Allergies   Allergen Reactions    Clindamycin Hives and Contact Dermatitis    Erythromycin [Akne-Mycin]      Red man     Lisinopril          Current Outpatient Medications   Medication Sig    olmesartan (BENICAR) 40 MG Tab Take 1 Tablet by mouth every day.    amLODIPine (NORVASC) 5 MG Tab Take 1 Tablet by mouth every day.    hydrochlorothiazide (MICROZIDE) 12.5 MG capsule Take 1 Capsule by mouth 1 time a day as needed (blood pressure over 170/100).    Estradiol (MICHAEL) 0.025 MG/24HR PATCH BIWEEKLY APPLY 1 PATCH TOPICALLY TWICE A WEEK    omeprazole (PRILOSEC) 20 MG delayed-release capsule Take 1 capsule by mouth once daily    atorvastatin (LIPITOR) 20 MG Tab TAKE 1 TABLET BY MOUTH ONCE DAILY IN THE EVENING    albuterol 108 (90 Base) MCG/ACT Aero Soln inhalation aerosol Inhale 2 Puffs every 6 hours as needed for Shortness of Breath.    Cholecalciferol 1.25 MG (73197 UT) Tab VITAMIN D TABLET         Review of Systems   Constitutional:  Positive for malaise/fatigue.   Respiratory:  Negative for shortness of breath.    Cardiovascular:  Positive for chest pain. Negative for palpitations, leg swelling and PND.   Neurological:  Positive for headaches.          Objective:   BP (!) 164/84 (BP Location: Left arm, Patient Position: Sitting)   Pulse 74   Resp 16   Ht 1.651 m (5' 5\")   Wt 69.9 kg (154 lb)   SpO2 97%  Body mass index is 25.63 kg/m².         Physical Exam  Vitals reviewed.   HENT:      Head: Normocephalic and atraumatic.   Cardiovascular:      Rate and Rhythm: Normal rate.      Heart sounds: Murmur heard.   Pulmonary:      Effort: Pulmonary effort is normal. No respiratory distress.      Breath sounds: No rales.   Musculoskeletal:      Right lower leg: No edema.      Left lower leg: No edema.   Skin:     General: Skin is warm.   Neurological:      Mental Status: She is alert.            Assessment:     1. Essential hypertension  olmesartan (BENICAR) 40 MG Tab    amLODIPine " (NORVASC) 5 MG Tab    hydrochlorothiazide (MICROZIDE) 12.5 MG capsule    Basic Metabolic Panel      2. Mixed hyperlipidemia  Lipid Profile      3. Coronary artery disease due to calcified coronary lesion             Medical Decision Making:  Today's Assessment / Plan:   Hypertension, poorly controlled  - change to olmesartan 40mg  - add amlodipine 5mg  - she doesn't like HCTZ but is willing to use as needed for BP>170, if tolerating this then we'll start her on a scheduled dose    Systolic murmur- check echo for next visit    CAC with hyperlipidemia  - recheck cholesterol    Return in about 6 months (around 12/7/2024) for follow up with me.  Sooner if problems.

## 2024-06-13 ENCOUNTER — APPOINTMENT (OUTPATIENT)
Dept: MEDICAL GROUP | Facility: LAB | Age: 74
End: 2024-06-13
Payer: COMMERCIAL

## 2024-06-28 ENCOUNTER — OFFICE VISIT (OUTPATIENT)
Dept: MEDICAL GROUP | Facility: LAB | Age: 74
End: 2024-06-28
Payer: COMMERCIAL

## 2024-06-28 VITALS
HEIGHT: 65 IN | OXYGEN SATURATION: 96 % | HEART RATE: 62 BPM | WEIGHT: 155 LBS | SYSTOLIC BLOOD PRESSURE: 140 MMHG | TEMPERATURE: 98 F | BODY MASS INDEX: 25.83 KG/M2 | DIASTOLIC BLOOD PRESSURE: 70 MMHG | RESPIRATION RATE: 16 BRPM

## 2024-06-28 DIAGNOSIS — F43.9 STRESS: ICD-10-CM

## 2024-06-28 DIAGNOSIS — F41.9 ANXIETY: ICD-10-CM

## 2024-06-28 RX ORDER — HYDROXYZINE HYDROCHLORIDE 25 MG/1
25 TABLET, FILM COATED ORAL 3 TIMES DAILY PRN
Qty: 30 TABLET | Refills: 0 | Status: SHIPPED | OUTPATIENT
Start: 2024-06-28

## 2024-06-28 ASSESSMENT — FIBROSIS 4 INDEX: FIB4 SCORE: 1.65

## 2024-06-29 NOTE — PROGRESS NOTES
"Chief Complaint   Patient presents with    Hypertension    Paperwork     FMLA       Verbal consent was acquired by the patient to use [employer] ambient listening note generation during this visit Yes      HPI:  History of Present Illness  The patient presents for evaluation of stress and anxiety.    The patient reports experiencing significant stress, which she attributes to her work-related stress. She recounts an incident at the [employer] where she was transitioned from night shifts to daytime shifts for five days a week, which has been a source of stress for her. She has been working days for the past 3 weeks and is seeking some days off so that she is able to relax and get the stress off.  Patient said she is unable to work and unable to sleep at night.  Making her very anxious and stressed out.    Denies any intentions to harm self or others.              Exam:     BP (!) 140/70 (BP Location: Left arm, Patient Position: Sitting, BP Cuff Size: Adult)   Pulse 62   Temp 36.7 °C (98 °F) (Temporal)   Resp 16   Ht 1.651 m (5' 5\")   Wt 70.3 kg (155 lb)   LMP  (LMP Unknown)   SpO2 96%   BMI 25.79 kg/m²   Gen.: Well-developed, well-nourished, no apparent distress,, emotional and stressed out, looks anxious, cooperative with the examination  HEENT: Normocephalic/atraumatic,   Emotional during the encounter  Assessment & Plan  1. Stress  New concern  Patient is suffering from this acute situational stress for the past few weeks, unable to sleep at night.  Discussed with the patient behavioral modifications relaxation techniques most likely it situational because of her stress at work.  Patient advised to follow-up with behavioral health specialist, will give her 2 weeks off her job to destress.  - Referral to Behavioral Health    2. Anxiety  Situational most likely, advised to use hydroxyzine as needed, follow-up with behavioral health.  - Referral to Behavioral Health    Paperwork for the patient to submit to her " employer was handed to patient today and copy scanned to her chart.    Please note that this dictation was created using voice recognition software. I have worked with consultants from the vendor as well as technical experts from [facility] to optimize the interface. I have made every reasonable attempt to correct obvious errors, but I expect that there are errors of grammar and possibly content that I did not discover before finalizing the note.

## 2024-07-02 RX ORDER — OMEPRAZOLE 20 MG/1
CAPSULE, DELAYED RELEASE ORAL
Qty: 90 CAPSULE | Refills: 3 | Status: SHIPPED | OUTPATIENT
Start: 2024-07-02

## 2024-07-09 ENCOUNTER — DOCUMENTATION (OUTPATIENT)
Dept: HEALTH INFORMATION MANAGEMENT | Facility: OTHER | Age: 74
End: 2024-07-09
Payer: COMMERCIAL

## 2024-07-26 DIAGNOSIS — Z79.890 HORMONE REPLACEMENT THERAPY (HRT): ICD-10-CM

## 2024-07-26 RX ORDER — ESTRADIOL 0.03 MG/D
FILM, EXTENDED RELEASE TRANSDERMAL
Qty: 8 PATCH | Refills: 0 | Status: SHIPPED | OUTPATIENT
Start: 2024-07-26

## 2024-08-01 ENCOUNTER — TELEPHONE (OUTPATIENT)
Dept: HEALTH INFORMATION MANAGEMENT | Facility: OTHER | Age: 74
End: 2024-08-01
Payer: COMMERCIAL

## 2024-08-24 DIAGNOSIS — Z79.890 HORMONE REPLACEMENT THERAPY (HRT): ICD-10-CM

## 2024-08-26 RX ORDER — ESTRADIOL 0.03 MG/D
FILM, EXTENDED RELEASE TRANSDERMAL
Qty: 8 PATCH | Refills: 0 | Status: SHIPPED | OUTPATIENT
Start: 2024-08-26

## 2024-08-27 DIAGNOSIS — E78.5 HYPERLIPIDEMIA, UNSPECIFIED HYPERLIPIDEMIA TYPE: ICD-10-CM

## 2024-08-27 RX ORDER — ATORVASTATIN CALCIUM 20 MG/1
20 TABLET, FILM COATED ORAL EVERY EVENING
Qty: 90 TABLET | Refills: 0 | Status: SHIPPED | OUTPATIENT
Start: 2024-08-27

## 2024-08-27 NOTE — TELEPHONE ENCOUNTER
Received request via: Pharmacy    Was the patient seen in the last year in this department? Yes6/28/24    Does the patient have an active prescription (recently filled or refills available) for medication(s) requested? No    Pharmacy Name: [facility]    Does the patient have senior living Plus and need 100-day supply? (This applies to ALL medications) Patient does not have SCP

## 2024-10-09 DIAGNOSIS — Z79.890 HORMONE REPLACEMENT THERAPY (HRT): ICD-10-CM

## 2024-10-09 RX ORDER — ESTRADIOL 0.03 MG/D
FILM, EXTENDED RELEASE TRANSDERMAL
Qty: 8 PATCH | Refills: 0 | Status: SHIPPED | OUTPATIENT
Start: 2024-10-09

## 2024-10-18 ENCOUNTER — TELEPHONE (OUTPATIENT)
Dept: HEALTH INFORMATION MANAGEMENT | Facility: OTHER | Age: 74
End: 2024-10-18
Payer: COMMERCIAL

## 2024-12-04 DIAGNOSIS — Z79.890 HORMONE REPLACEMENT THERAPY (HRT): ICD-10-CM

## 2024-12-04 DIAGNOSIS — E78.5 HYPERLIPIDEMIA, UNSPECIFIED HYPERLIPIDEMIA TYPE: ICD-10-CM

## 2024-12-04 RX ORDER — ATORVASTATIN CALCIUM 20 MG/1
20 TABLET, FILM COATED ORAL EVERY EVENING
Qty: 30 TABLET | Refills: 0 | Status: SHIPPED | OUTPATIENT
Start: 2024-12-04

## 2024-12-04 RX ORDER — ESTRADIOL 0.03 MG/D
FILM, EXTENDED RELEASE TRANSDERMAL
Qty: 8 PATCH | Refills: 0 | Status: SHIPPED | OUTPATIENT
Start: 2024-12-04

## 2024-12-04 NOTE — TELEPHONE ENCOUNTER
Received request via: Pharmacy    Was the patient seen in the last year in this department? Yes    Does the patient have an active prescription (recently filled or refills available) for medication(s) requested? No    Pharmacy Name:      [facility] [address] [place], [place] - [address]  [address]  [place] [address]  Phone: [phone] Fax: [phone]       Does the patient have shelter Plus and need 100-day supply? (This applies to ALL medications) Patient does not have SCP

## 2024-12-04 NOTE — TELEPHONE ENCOUNTER
Received request via: Pharmacy    Was the patient seen in the last year in this department? Yes    Does the patient have an active prescription (recently filled or refills available) for medication(s) requested? No    Pharmacy Name:   [facility] [place] - [address]  [facility] [address]  Phone: [phone] Fax: [phone]          Does the patient have nursing home Plus and need 100-day supply? (This applies to ALL medications) Patient does not have SCP

## 2024-12-24 ENCOUNTER — OFFICE VISIT (OUTPATIENT)
Dept: MEDICAL GROUP | Age: 74
End: 2024-12-24
Payer: COMMERCIAL

## 2024-12-24 VITALS
RESPIRATION RATE: 20 BRPM | DIASTOLIC BLOOD PRESSURE: 80 MMHG | SYSTOLIC BLOOD PRESSURE: 154 MMHG | HEART RATE: 70 BPM | HEIGHT: 65 IN | TEMPERATURE: 98.2 F | WEIGHT: 155 LBS | BODY MASS INDEX: 25.83 KG/M2 | OXYGEN SATURATION: 96 %

## 2024-12-24 DIAGNOSIS — R73.9 HYPERGLYCEMIA: ICD-10-CM

## 2024-12-24 DIAGNOSIS — Z79.890 HORMONE REPLACEMENT THERAPY: ICD-10-CM

## 2024-12-24 DIAGNOSIS — G43.809 OTHER MIGRAINE WITHOUT STATUS MIGRAINOSUS, NOT INTRACTABLE: ICD-10-CM

## 2024-12-24 DIAGNOSIS — Z12.31 ENCOUNTER FOR SCREENING MAMMOGRAM FOR BREAST CANCER: ICD-10-CM

## 2024-12-24 DIAGNOSIS — I10 ESSENTIAL HYPERTENSION: ICD-10-CM

## 2024-12-24 DIAGNOSIS — E55.9 VITAMIN D DEFICIENCY: ICD-10-CM

## 2024-12-24 DIAGNOSIS — E78.2 MIXED HYPERLIPIDEMIA: ICD-10-CM

## 2024-12-24 DIAGNOSIS — I10 UNCONTROLLED HYPERTENSION: ICD-10-CM

## 2024-12-24 DIAGNOSIS — G44.89 OTHER HEADACHE SYNDROME: ICD-10-CM

## 2024-12-24 DIAGNOSIS — H53.9 TRANSIENT VISION DISTURBANCE: ICD-10-CM

## 2024-12-24 PROCEDURE — 3077F SYST BP >= 140 MM HG: CPT | Performed by: INTERNAL MEDICINE

## 2024-12-24 PROCEDURE — 99214 OFFICE O/P EST MOD 30 MIN: CPT | Performed by: INTERNAL MEDICINE

## 2024-12-24 PROCEDURE — 3079F DIAST BP 80-89 MM HG: CPT | Performed by: INTERNAL MEDICINE

## 2024-12-24 RX ORDER — ASPIRIN 81 MG/1
81 TABLET ORAL DAILY
COMMUNITY

## 2024-12-24 RX ORDER — SUMATRIPTAN 50 MG/1
50 TABLET, FILM COATED ORAL
Qty: 10 TABLET | Refills: 3 | Status: SHIPPED | OUTPATIENT
Start: 2024-12-24

## 2024-12-24 RX ORDER — METOPROLOL SUCCINATE 50 MG/1
50 TABLET, EXTENDED RELEASE ORAL DAILY
Qty: 90 TABLET | Refills: 2 | Status: SHIPPED | OUTPATIENT
Start: 2024-12-24

## 2024-12-24 SDOH — ECONOMIC STABILITY: INCOME INSECURITY: IN THE LAST 12 MONTHS, WAS THERE A TIME WHEN YOU WERE NOT ABLE TO PAY THE MORTGAGE OR RENT ON TIME?: PATIENT DECLINED

## 2024-12-24 SDOH — HEALTH STABILITY: PHYSICAL HEALTH

## 2024-12-24 SDOH — ECONOMIC STABILITY: TRANSPORTATION INSECURITY
IN THE PAST 12 MONTHS, HAS LACK OF TRANSPORTATION KEPT YOU FROM MEETINGS, WORK, OR FROM GETTING THINGS NEEDED FOR DAILY LIVING?: PATIENT DECLINED

## 2024-12-24 SDOH — ECONOMIC STABILITY: FOOD INSECURITY: WITHIN THE PAST 12 MONTHS, THE FOOD YOU BOUGHT JUST DIDN'T LAST AND YOU DIDN'T HAVE MONEY TO GET MORE.: PATIENT DECLINED

## 2024-12-24 SDOH — ECONOMIC STABILITY: TRANSPORTATION INSECURITY
IN THE PAST 12 MONTHS, HAS LACK OF RELIABLE TRANSPORTATION KEPT YOU FROM MEDICAL APPOINTMENTS, MEETINGS, WORK OR FROM GETTING THINGS NEEDED FOR DAILY LIVING?: PATIENT DECLINED

## 2024-12-24 SDOH — ECONOMIC STABILITY: HOUSING INSECURITY
IN THE LAST 12 MONTHS, WAS THERE A TIME WHEN YOU DID NOT HAVE A STEADY PLACE TO SLEEP OR SLEPT IN A SHELTER (INCLUDING NOW)?: PATIENT DECLINED

## 2024-12-24 SDOH — ECONOMIC STABILITY: TRANSPORTATION INSECURITY
IN THE PAST 12 MONTHS, HAS THE LACK OF TRANSPORTATION KEPT YOU FROM MEDICAL APPOINTMENTS OR FROM GETTING MEDICATIONS?: PATIENT DECLINED

## 2024-12-24 SDOH — HEALTH STABILITY: MENTAL HEALTH

## 2024-12-24 SDOH — ECONOMIC STABILITY: FOOD INSECURITY: WITHIN THE PAST 12 MONTHS, YOU WORRIED THAT YOUR FOOD WOULD RUN OUT BEFORE YOU GOT MONEY TO BUY MORE.: PATIENT DECLINED

## 2024-12-24 SDOH — ECONOMIC STABILITY: INCOME INSECURITY: HOW HARD IS IT FOR YOU TO PAY FOR THE VERY BASICS LIKE FOOD, HOUSING, MEDICAL CARE, AND HEATING?: PATIENT DECLINED

## 2024-12-24 ASSESSMENT — LIFESTYLE VARIABLES
HOW MANY STANDARD DRINKS CONTAINING ALCOHOL DO YOU HAVE ON A TYPICAL DAY: PATIENT DECLINED
HOW OFTEN DO YOU HAVE SIX OR MORE DRINKS ON ONE OCCASION: PATIENT DECLINED
SKIP TO QUESTIONS 9-10: 0
AUDIT-C TOTAL SCORE: -1
HOW OFTEN DO YOU HAVE A DRINK CONTAINING ALCOHOL: PATIENT DECLINED

## 2024-12-24 ASSESSMENT — ENCOUNTER SYMPTOMS
CHILLS: 0
FEVER: 0
HEADACHES: 1
SHORTNESS OF BREATH: 0
BLURRED VISION: 1

## 2024-12-24 ASSESSMENT — PATIENT HEALTH QUESTIONNAIRE - PHQ9: CLINICAL INTERPRETATION OF PHQ2 SCORE: 0

## 2024-12-24 ASSESSMENT — SOCIAL DETERMINANTS OF HEALTH (SDOH)
HOW HARD IS IT FOR YOU TO PAY FOR THE VERY BASICS LIKE FOOD, HOUSING, MEDICAL CARE, AND HEATING?: PATIENT DECLINED
WITHIN THE PAST 12 MONTHS, YOU WORRIED THAT YOUR FOOD WOULD RUN OUT BEFORE YOU GOT THE MONEY TO BUY MORE: PATIENT DECLINED
HOW OFTEN DO YOU HAVE SIX OR MORE DRINKS ON ONE OCCASION: PATIENT DECLINED
HOW MANY DRINKS CONTAINING ALCOHOL DO YOU HAVE ON A TYPICAL DAY WHEN YOU ARE DRINKING: PATIENT DECLINED
HOW OFTEN DO YOU HAVE A DRINK CONTAINING ALCOHOL: PATIENT DECLINED
IN THE PAST 12 MONTHS, HAS THE ELECTRIC, GAS, OIL, OR WATER COMPANY THREATENED TO SHUT OFF SERVICE IN YOUR HOME?: PATIENT DECLINED

## 2024-12-24 ASSESSMENT — FIBROSIS 4 INDEX: FIB4 SCORE: 1.68

## 2024-12-24 NOTE — PROGRESS NOTES
CC:   Chief Complaint   Patient presents with    Establish Care     History of TIA      Diagnoses of Uncontrolled hypertension, Essential hypertension, Other headache syndrome, Transient vision disturbance, Mixed hyperlipidemia, Vitamin D deficiency, Hyperglycemia, Encounter for screening mammogram for breast cancer, Other migraine without status migrainosus, not intractable, and Hormone replacement therapy were pertinent to this visit.  Verbal consent was acquired by the patient to use [employer] ambient listening note generation during this visit Yes       History of Present Illness  Jacqueline is a pleasant 74-year-old female presenting to [facility]. She has a history of hypertension, hyperlipidemia, and vitamin D deficiency.     She reports experiencing uncontrolled blood pressure fluctuations, with a recent episode of 200/102 during a night shift at work. She does not maintain a blood pressure log. She is currently on olmesartan and amlodipine 5 mg daily but only takes the latter intermittently due to adverse effects. She also takes hydrochlorothiazide as needed but can not tolerate daily use due to excessive dryness. She has never been prescribed metoprolol and does not recall taking spironolactone. She is open to trying metoprolol for her hypertension.  She is uncertain if she has been advised to follow a low-sodium diet. 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    She is currently on low-dose estradiol and has noticed a significant increase in blood pressure when attempting to discontinue it. She is unsure if her hormones are imbalanced.    She experienced an episode, characterized by an intense headache on the right side of her face and slurred speech. A nurse suggested it might be a stroke. She has a history of similar episodes, having been hospitalized twice over the years. She is not currently taking baby aspirin. She reports no unusual stress levels around the  holidays.    She has been experiencing unilateral vision loss, which has been evaluated by an ophthalmologist who found no abnormalities. She has had 4 such episodes in the past month, accompanied by severe headaches on the left side of her head and difficulty finding words. These symptoms have been progressively worsening. She has never been referred to a neurologist and is unsure if she has ever undergone a brain MRI. She manages her headaches with Tylenol or ibuprofen and typically rests during episodes of transient vision loss. The most recent episode lasted approximately 5 minutes.    She is up to date with her mammograms, which she undergoes annually.     SOCIAL HISTORY  She works as a nurse.    Past Medical History:   Diagnosis Date    Arthritis     Backpain     Herniated disks    Bleeding from the nose     Bronchitis     Chronic pain of right hip     Gastroesophageal reflux disease without esophagitis     Heart burn     Hemorrhoids     Hyperlipidemia     Hypertension     Measles     Mumps     Pneumonia     TIA (transient ischemic attack) 2012    Urinary tract infection        Current Outpatient Medications Ordered in Epic   Medication Sig Dispense Refill    metoprolol SR (TOPROL XL) 50 MG TABLET SR 24 HR Take 1 Tablet by mouth every day. 90 Tablet 2    aspirin 81 MG EC tablet Take 81 mg by mouth every day.      SUMAtriptan (IMITREX) 50 MG Tab Take 1 Tablet by mouth one time as needed for Migraine for up to 1 dose. 10 Tablet 3    Estradiol (MICHAEL) 0.025 MG/24HR PATCH BIWEEKLY APPLY 1 PATCH TOPICALLY TWICE A WEEK . APPOINTMENT REQUIRED FOR FUTURE REFILLS 8 Patch 0    atorvastatin (LIPITOR) 20 MG Tab Take 1 Tablet by mouth every evening. 30 Tablet 0    omeprazole (PRILOSEC) 20 MG delayed-release capsule Take 1 capsule by mouth once daily 90 Capsule 3    hydrOXYzine HCl (ATARAX) 25 MG Tab Take 1 Tablet by mouth 3 times a day as needed for Itching. 30 Tablet 0    olmesartan (BENICAR) 40 MG Tab Take 1 Tablet by  "mouth every day. 90 Tablet 3    amLODIPine (NORVASC) 5 MG Tab Take 1 Tablet by mouth every day. 90 Tablet 3    hydrochlorothiazide (MICROZIDE) 12.5 MG capsule Take 1 Capsule by mouth 1 time a day as needed (blood pressure over 170/100). 90 Capsule 1    albuterol 108 (90 Base) MCG/ACT Aero Soln inhalation aerosol Inhale 2 Puffs every 6 hours as needed for Shortness of Breath. 8.5 g 0    Cholecalciferol 1.25 MG (53372 UT) Tab VITAMIN D TABLET       No current Epic-ordered facility-administered medications on file.     Review of Systems   Constitutional:  Negative for chills and fever.   Eyes:  Positive for blurred vision.   Respiratory:  Negative for shortness of breath.    Cardiovascular:  Negative for chest pain.   Neurological:  Positive for headaches.   All other systems reviewed and are negative.      Objective:     Exam:  BP (!) 154/80 (BP Location: Left arm, Patient Position: Sitting, BP Cuff Size: Adult)   Pulse 70   Temp 36.8 °C (98.2 °F) (Temporal)   Resp 20   Ht 1.651 m (5' 5\")   Wt 70.3 kg (155 lb)   LMP  (LMP Unknown)   SpO2 96%   BMI 25.79 kg/m²  Body mass index is 25.79 kg/m².    Physical Exam  Constitutional:       Appearance: Normal appearance.   HENT:      Head: Normocephalic and atraumatic.      Nose: Nose normal. No congestion or rhinorrhea.      Mouth/Throat:      Pharynx: No oropharyngeal exudate.   Eyes:      Conjunctiva/sclera: Conjunctivae normal.   Cardiovascular:      Rate and Rhythm: Normal rate and regular rhythm.      Pulses: Normal pulses.      Heart sounds: Normal heart sounds. No murmur heard.  Pulmonary:      Effort: Pulmonary effort is normal. No respiratory distress.      Breath sounds: Normal breath sounds.   Skin:     General: Skin is warm.   Neurological:      Mental Status: She is alert and oriented to person, place, and time.   Psychiatric:         Mood and Affect: Mood normal.         Behavior: Behavior normal.         Thought Content: Thought content normal.         " Judgment: Judgment normal.       Results:  Results  Lab Results   Component Value Date/Time    HBA1C 5.7 (H) 09/06/2023 09:55 AM      Lab Results   Component Value Date/Time    CHOLSTRLTOT 183 09/06/2023 09:55 AM     (H) 09/06/2023 09:55 AM    HDL 55 09/06/2023 09:55 AM    TRIGLYCERIDE 95 09/06/2023 09:55 AM       Lab Results   Component Value Date/Time    SODIUM 139 03/13/2024 10:02 AM    POTASSIUM 4.1 03/13/2024 10:02 AM    CHLORIDE 102 03/13/2024 10:02 AM    CO2 26 03/13/2024 10:02 AM    GLUCOSE 83 03/13/2024 10:02 AM    BUN 9 03/13/2024 10:02 AM    CREATININE 0.68 03/13/2024 10:02 AM    CREATININE 0.8 03/14/2008 05:00 AM    BUNCREATRAT 14 07/29/2022 10:06 AM     Lab Results   Component Value Date/Time    ALKPHOSPHAT 73 03/13/2024 10:02 AM    ASTSGOT 23 03/13/2024 10:02 AM    ALTSGPT 20 03/13/2024 10:02 AM    TBILIRUBIN 0.6 03/13/2024 10:02 AM          Assessment & Plan:   Jacqueline  is a pleasant 74 y.o. female with the following -   Assessment & Plan  1. Transient vision loss.  The etiology of the transient vision changes could be attributed to migraines with aura or potentially a true transient ischemic attack (TIA). An MRI of the brain has been ordered to further investigate the cause. A referral to neurology has been initiated. A prescription for sumatriptan has been provided for use as needed, should the cause be determined to be migraines. She has been advised to commence a daily regimen of aspirin 81 mg.    2. Uncontrolled hypertension.  Her hypertension remains uncontrolled, with no consistent log kept and reported erratic and frequently elevated blood pressure readings. She is currently on a regular regimen of olmesartan 40 mg daily, amlodipine, and hydrochlorothiazide. Metoprolol 50 mg has been added to her treatment plan to enhance blood pressure control. She has been advised to maintain a blood pressure log. She has expressed a preference for a referral to cardiology, specifically Dr. Marin.    3.  Hormone replacement therapy.  She is currently using an estradiol patch and has encountered difficulties in her attempts to wean herself off it. She has been advised that discontinuation of the estradiol patch is necessary due to the potential increased risk of heart attack or stroke associated with its continued use.    4. Hyperlipidemia.  She is currently on a daily regimen of atorvastatin 20 mg, pending the results of her lipid panel.    5. Health maintenance.  She had a colonoscopy in 2019 which was normal and she is not due until 2029. A mammogram has been ordered, as she is due for one in February    Follow-up  The patient will follow up in 3 to 4 weeks for a blood pressure check.      Problem List Items Addressed This Visit       Essential hypertension    Relevant Medications    metoprolol SR (TOPROL XL) 50 MG TABLET SR 24 HR    Other Relevant Orders    MR-BRAIN-W/O    CBC WITH DIFFERENTIAL    Comp Metabolic Panel    TSH WITH REFLEX TO FT4    Hormone replacement therapy    Mixed hyperlipidemia    Relevant Medications    metoprolol SR (TOPROL XL) 50 MG TABLET SR 24 HR    Other Relevant Orders    Lipid Profile    Other headache syndrome    Relevant Medications    metoprolol SR (TOPROL XL) 50 MG TABLET SR 24 HR    aspirin 81 MG EC tablet    SUMAtriptan (IMITREX) 50 MG Tab    Other Relevant Orders    Referral to Neurology    MR-BRAIN-W/O    Vitamin D deficiency disease    Relevant Orders    VITAMIN D,25 HYDROXY (DEFICIENCY)     Other Visit Diagnoses       Uncontrolled hypertension        Relevant Medications    metoprolol SR (TOPROL XL) 50 MG TABLET SR 24 HR    Other Relevant Orders    REFERRAL TO CARDIOLOGY    Transient vision disturbance        Relevant Orders    Referral to Neurology    MR-BRAIN-W/O    Hyperglycemia        Relevant Orders    HEMOGLOBIN A1C    Encounter for screening mammogram for breast cancer        Relevant Orders    MA-SCREENING MAMMO BILAT W/TOMOSYNTHESIS W/CAD    Other migraine without  status migrainosus, not intractable        Relevant Medications    metoprolol SR (TOPROL XL) 50 MG TABLET SR 24 HR    aspirin 81 MG EC tablet    SUMAtriptan (IMITREX) 50 MG Tab          Please note that this dictation was created using voice recognition software. I have made every reasonable attempt to correct obvious errors, but I expect that there are errors of grammar and possibly content that I did not discover before finalizing the note.

## 2025-01-08 ENCOUNTER — TELEPHONE (OUTPATIENT)
Dept: HEALTH INFORMATION MANAGEMENT | Facility: OTHER | Age: 75
End: 2025-01-08
Payer: COMMERCIAL

## 2025-01-13 ENCOUNTER — APPOINTMENT (OUTPATIENT)
Dept: NEUROLOGY | Facility: MEDICAL CENTER | Age: 75
End: 2025-01-13
Attending: PSYCHIATRY & NEUROLOGY
Payer: COMMERCIAL

## 2025-01-14 ENCOUNTER — HOSPITAL ENCOUNTER (OUTPATIENT)
Dept: LAB | Facility: MEDICAL CENTER | Age: 75
End: 2025-01-14
Attending: INTERNAL MEDICINE
Payer: COMMERCIAL

## 2025-01-14 DIAGNOSIS — E55.9 VITAMIN D DEFICIENCY: ICD-10-CM

## 2025-01-14 DIAGNOSIS — E78.2 MIXED HYPERLIPIDEMIA: ICD-10-CM

## 2025-01-14 DIAGNOSIS — I10 ESSENTIAL HYPERTENSION: ICD-10-CM

## 2025-01-14 DIAGNOSIS — R73.9 HYPERGLYCEMIA: ICD-10-CM

## 2025-01-14 LAB
25(OH)D3 SERPL-MCNC: 33 NG/ML (ref 30–100)
ALBUMIN SERPL BCP-MCNC: 4.4 G/DL (ref 3.2–4.9)
ALBUMIN/GLOB SERPL: 1.6 G/DL
ALP SERPL-CCNC: 69 U/L (ref 30–99)
ALT SERPL-CCNC: 19 U/L (ref 2–50)
ANION GAP SERPL CALC-SCNC: 10 MMOL/L (ref 7–16)
AST SERPL-CCNC: 22 U/L (ref 12–45)
BASOPHILS # BLD AUTO: 0.2 % (ref 0–1.8)
BASOPHILS # BLD: 0.01 K/UL (ref 0–0.12)
BILIRUB SERPL-MCNC: 0.7 MG/DL (ref 0.1–1.5)
BUN SERPL-MCNC: 13 MG/DL (ref 8–22)
CALCIUM ALBUM COR SERPL-MCNC: 9.1 MG/DL (ref 8.5–10.5)
CALCIUM SERPL-MCNC: 9.4 MG/DL (ref 8.4–10.2)
CHLORIDE SERPL-SCNC: 105 MMOL/L (ref 96–112)
CHOLEST SERPL-MCNC: 179 MG/DL (ref 100–199)
CO2 SERPL-SCNC: 27 MMOL/L (ref 20–33)
CREAT SERPL-MCNC: 0.67 MG/DL (ref 0.5–1.4)
EOSINOPHIL # BLD AUTO: 0.08 K/UL (ref 0–0.51)
EOSINOPHIL NFR BLD: 1.8 % (ref 0–6.9)
ERYTHROCYTE [DISTWIDTH] IN BLOOD BY AUTOMATED COUNT: 44.4 FL (ref 35.9–50)
EST. AVERAGE GLUCOSE BLD GHB EST-MCNC: 117 MG/DL
FASTING STATUS PATIENT QL REPORTED: NORMAL
GFR SERPLBLD CREATININE-BSD FMLA CKD-EPI: 91 ML/MIN/1.73 M 2
GLOBULIN SER CALC-MCNC: 2.8 G/DL (ref 1.9–3.5)
GLUCOSE SERPL-MCNC: 91 MG/DL (ref 65–99)
HBA1C MFR BLD: 5.7 % (ref 4–5.6)
HCT VFR BLD AUTO: 46.2 % (ref 37–47)
HDLC SERPL-MCNC: 57 MG/DL
HGB BLD-MCNC: 15 G/DL (ref 12–16)
IMM GRANULOCYTES # BLD AUTO: 0.01 K/UL (ref 0–0.11)
IMM GRANULOCYTES NFR BLD AUTO: 0.2 % (ref 0–0.9)
LDLC SERPL CALC-MCNC: 103 MG/DL
LYMPHOCYTES # BLD AUTO: 1.86 K/UL (ref 1–4.8)
LYMPHOCYTES NFR BLD: 42.8 % (ref 22–41)
MCH RBC QN AUTO: 30.9 PG (ref 27–33)
MCHC RBC AUTO-ENTMCNC: 32.5 G/DL (ref 32.2–35.5)
MCV RBC AUTO: 95.3 FL (ref 81.4–97.8)
MONOCYTES # BLD AUTO: 0.36 K/UL (ref 0–0.85)
MONOCYTES NFR BLD AUTO: 8.3 % (ref 0–13.4)
NEUTROPHILS # BLD AUTO: 2.03 K/UL (ref 1.82–7.42)
NEUTROPHILS NFR BLD: 46.7 % (ref 44–72)
NRBC # BLD AUTO: 0 K/UL
NRBC BLD-RTO: 0 /100 WBC (ref 0–0.2)
PLATELET # BLD AUTO: 216 K/UL (ref 164–446)
PMV BLD AUTO: 11.1 FL (ref 9–12.9)
POTASSIUM SERPL-SCNC: 4.5 MMOL/L (ref 3.6–5.5)
PROT SERPL-MCNC: 7.2 G/DL (ref 6–8.2)
RBC # BLD AUTO: 4.85 M/UL (ref 4.2–5.4)
SODIUM SERPL-SCNC: 142 MMOL/L (ref 135–145)
TRIGL SERPL-MCNC: 97 MG/DL (ref 0–149)
TSH SERPL DL<=0.005 MIU/L-ACNC: 0.84 UIU/ML (ref 0.38–5.33)
WBC # BLD AUTO: 4.4 K/UL (ref 4.8–10.8)

## 2025-01-14 PROCEDURE — 83036 HEMOGLOBIN GLYCOSYLATED A1C: CPT

## 2025-01-14 PROCEDURE — 85025 COMPLETE CBC W/AUTO DIFF WBC: CPT

## 2025-01-14 PROCEDURE — 82306 VITAMIN D 25 HYDROXY: CPT

## 2025-01-14 PROCEDURE — 80061 LIPID PANEL: CPT

## 2025-01-14 PROCEDURE — 84443 ASSAY THYROID STIM HORMONE: CPT

## 2025-01-14 PROCEDURE — 36415 COLL VENOUS BLD VENIPUNCTURE: CPT

## 2025-01-14 PROCEDURE — 80053 COMPREHEN METABOLIC PANEL: CPT

## 2025-02-24 ENCOUNTER — TELEPHONE (OUTPATIENT)
Dept: HEALTH INFORMATION MANAGEMENT | Facility: OTHER | Age: 75
End: 2025-02-24
Payer: COMMERCIAL

## 2025-02-26 ENCOUNTER — APPOINTMENT (OUTPATIENT)
Dept: NEUROLOGY | Facility: MEDICAL CENTER | Age: 75
End: 2025-02-26
Payer: COMMERCIAL

## 2025-03-04 ENCOUNTER — HOSPITAL ENCOUNTER (OUTPATIENT)
Dept: RADIOLOGY | Facility: MEDICAL CENTER | Age: 75
End: 2025-03-04
Attending: INTERNAL MEDICINE
Payer: COMMERCIAL

## 2025-03-04 DIAGNOSIS — I10 ESSENTIAL HYPERTENSION: ICD-10-CM

## 2025-03-04 DIAGNOSIS — H53.9 TRANSIENT VISION DISTURBANCE: ICD-10-CM

## 2025-03-04 DIAGNOSIS — G44.89 OTHER HEADACHE SYNDROME: ICD-10-CM

## 2025-03-04 PROCEDURE — 70551 MRI BRAIN STEM W/O DYE: CPT

## 2025-03-10 ENCOUNTER — RESULTS FOLLOW-UP (OUTPATIENT)
Dept: MEDICAL GROUP | Age: 75
End: 2025-03-10
Payer: COMMERCIAL

## 2025-06-04 ENCOUNTER — TELEPHONE (OUTPATIENT)
Dept: CARDIOLOGY | Facility: MEDICAL CENTER | Age: 75
End: 2025-06-04
Payer: COMMERCIAL

## 2025-06-04 DIAGNOSIS — I10 ESSENTIAL HYPERTENSION: ICD-10-CM

## 2025-06-04 DIAGNOSIS — E78.5 HYPERLIPIDEMIA, UNSPECIFIED HYPERLIPIDEMIA TYPE: ICD-10-CM

## 2025-06-04 RX ORDER — ATORVASTATIN CALCIUM 20 MG/1
20 TABLET, FILM COATED ORAL EVERY EVENING
Qty: 90 TABLET | Refills: 0 | Status: SHIPPED | OUTPATIENT
Start: 2025-06-04

## 2025-06-04 RX ORDER — OLMESARTAN MEDOXOMIL 40 MG/1
40 TABLET ORAL DAILY
Qty: 90 TABLET | Refills: 0 | Status: SHIPPED | OUTPATIENT
Start: 2025-06-04

## 2025-06-04 NOTE — TELEPHONE ENCOUNTER
[facility] to [facility] f[facility]v. Pt is not near her calendar rn and [facility]CB to [facility]. /cg 06/04/25

## 2025-06-04 NOTE — TELEPHONE ENCOUNTER
Is the patient due for a refill? Yes    Was the patient seen the last 15 months? Yes    Date of last office visit: 6/7/2024    Does the patient have an upcoming appointment?  No       Provider to refill: LOUISE    Does the patient have [facility] Plus and need 100-day supply? (This applies to ALL medications) Patient does not have SCP

## 2025-07-08 ENCOUNTER — HOSPITAL ENCOUNTER (OUTPATIENT)
Dept: RADIOLOGY | Facility: MEDICAL CENTER | Age: 75
End: 2025-07-08
Attending: INTERNAL MEDICINE
Payer: COMMERCIAL

## 2025-07-08 DIAGNOSIS — Z12.31 VISIT FOR SCREENING MAMMOGRAM: ICD-10-CM

## 2025-07-08 PROCEDURE — 77063 BREAST TOMOSYNTHESIS BI: CPT

## 2025-07-14 ENCOUNTER — RESULTS FOLLOW-UP (OUTPATIENT)
Dept: MEDICAL GROUP | Age: 75
End: 2025-07-14
Payer: COMMERCIAL

## 2025-07-15 ENCOUNTER — APPOINTMENT (OUTPATIENT)
Dept: MEDICAL GROUP | Facility: MEDICAL CENTER | Age: 75
End: 2025-07-15
Payer: COMMERCIAL

## 2025-08-06 ENCOUNTER — TELEPHONE (OUTPATIENT)
Dept: ENDOCRINOLOGY | Facility: MEDICAL CENTER | Age: 75
End: 2025-08-06
Payer: COMMERCIAL